# Patient Record
Sex: MALE | Race: WHITE | NOT HISPANIC OR LATINO | Employment: OTHER | URBAN - METROPOLITAN AREA
[De-identification: names, ages, dates, MRNs, and addresses within clinical notes are randomized per-mention and may not be internally consistent; named-entity substitution may affect disease eponyms.]

---

## 2020-08-14 ENCOUNTER — OFFICE VISIT CONVERTED (OUTPATIENT)
Dept: CARDIOLOGY | Facility: CLINIC | Age: 50
End: 2020-08-14
Attending: SPECIALIST

## 2020-08-28 ENCOUNTER — OFFICE VISIT CONVERTED (OUTPATIENT)
Dept: CARDIOLOGY | Facility: CLINIC | Age: 50
End: 2020-08-28
Attending: SPECIALIST

## 2021-05-10 NOTE — PROCEDURES
"   Procedure Note      Patient Name: Christian Copeland   Patient ID: [id number]   Sex: Male   YOB: 1970    Primary Care Provider: Sultana Shaw MD   Referring Provider: Sultana Shaw MD    Visit Date: August 28, 2020    Provider: Elias Lagos MD   Location: [facility]   Location Address: [address] [place] [address], [address]   [place]  [id number]   Location Phone: [phone]          FINAL REPORT   TRANSTHORACIC ECHOCARDIOGRAM REPORT    Diagnosis: Chest pain   Height: 6'1\" Weight: 285 B/P: 122/82 BSA: 2.5   [facility]   MEASUREMENTS:  RVID (Diastole) : RVID. (NORMAL: 0.7 to 2.4 cm max)   LVID (Systole): 3.1 cm (Diastole): 4.3 cm . (NORMAL: 3.7 - 5.4 cm)   Posterior Wall Thickness (Diastole): 1.0 cm. (NORMAL: 0.8 - 1.1 cm)   Septal Thickness (Diastole): 1.0 cm. (NORMAL: 0.7 - 1.2 cm)   LAID (Systole): 3.6 cm. (NORMAL: 1.9 - 3.8 cm)   Aortic Root Diameter (Diastole): 3.6 cm. (NORMAL: 2.0 - 3.7 cm)   DOPPLER:  E/A ratio 0.8 (NORMAL 0.8-2.0)   DT: 173 msec (NORMAL 140-240 msec.)   IVRT 95 m/sec (NORMAL  m/sec.)   E/E': 6 (NORMAL <8 avg.)   COMMENTS:  The patient underwent 2-D, M-Mode, and Doppler examination, including pulse-wave, continuous-wave, and color-flow analysis; the study is technically adequate.   FINDINGS:  AORTIC VALVE: Normal. Tricuspid in appearance with normal central closure.   MITRAL VALVE: Normal. Bicuspid in appearance.   TRICUSPID VALVE: Normal.   PULMONIC VALVE: Not well visualized.   LEFT ATRIUM: Normal. No intracavitary masses or clots seen. LA volume index is 18 mL/m2.   AORTIC ROOT: Normal in size with adequate motion.   LEFT VENTRICLE: Normal left ventricular systolic function. Ejection fraction 60%.   RIGHT ATRIUM: Normal.   RIGHT VENTRICLE: Normal size and function.   PERICARDIUM: Unremarkable. No evidence of effusion.   INFERIOR VENA CAVA: Diameter is 1.4 cm.   DOPPLER: Doppler examination of the aortic, mitral, tricuspid, and pulmonary valves was performed. " Normal pulmonary artery systolic pressure by Doppler. No significant valvular abnormalities.   Faxed: 08/31/2020      CONCLUSION:  1.  Normal left ventricular systolic function.   2.  No significant valvular abnormalities noted.        MD MANNY Haji/maria e    This note was transcribed by Nhi Diamond.  maria e/MANNY  The above service was transcribed by Nhi Diamond, and I attest to the accuracy of the note.  MANNY                 Electronically Signed by: Bernadette Diamond-, Other -Author on August 31, 2020 09:44:47 AM  Electronically Co-signed by: Elias Lagos MD -Reviewer on September 1, 2020 03:00:32 PM

## 2021-05-10 NOTE — PROCEDURES
Procedure Note      Patient Name: Christian Copeland   Patient ID: [id number]   Sex: Male   YOB: 1970    Primary Care Provider: Sultana Shaw MD   Referring Provider: Sultana Shaw MD    Visit Date: August 28, 2020    Provider: Elias Lagos MD   Location: [facility]   Location Address: [address] [place] [address], [address]   [place]  [id number]   Location Phone: [phone]          FINAL REPORT   CAROTID DOPPLER EXAMINATION-LEFT AND RIGHT CAROTID SYSTEM    Diagnosis: Dizziness   Copy To: Sultana Shaw MD   Tech: JTW   CLASSIFICATION OF ICA STENOSIS    Normal: ICA PSV<125 cm/sec, ICA/CCA PSV Ratio: <2.0, ICA EDV <40 cm/sec (No Plaque)   <50% stenosis: ICA PSV <125 cm/sec, ICA/CCA PSV Ratio: <2.0, ICA EDV <40 cm/sec   50-69% stenosis: ICA -230 cm/sec, ICA/CCA PSV Ratio: <2.0-4.0, ICA EDV  cm/sec   >=70% Stenosis: ICA PSV >230 cm/sec, ICA/CCA PSV Ratio: >4.0, ICA EDV> 100 cm/sec   Near Occlusion: ICA PSV: High, Low, Undetectable, ICA/CCA PSV Ratio: Variable, ICA EDV: Variable   DUPLEX VELOCITY IN cm/sec  RIGHT:  PCCA: 80/19   MCCA: 97/19   DCCA: 132/37   PICA: 67/26   MARY: 61/27   DICA: 66/28   ECA: 56/11   VERT: 33/10   LEFT:  PCCA: 115/26   MCCA: 95/27   DCCA: 155/40   PICA: 74/30   MARY: 63/25   DICA: 58/24   ECA: 52/15   VERT: 43/16   IMPRESSION:  The patient underwent bilateral carotid Doppler examination on 08/28/2020. The study is technically adequate. The following was observed:   B-mode imaging was performed. Common, internal, and external carotid arteries were identified. Imaging was of adequate quality. There was mild plaquing in the common carotid arteries.   Doppler flow velocities and spectral analysis of flow in the common, internal, and external carotid arteries was performed. The velocities were in the normal range. No evidence of any stenosis was identified.   CONCLUSION:  Bilateral carotid Doppler examination shows no significant carotid stenosis.           MD MANNY Haji/maria e    This note was transcribed by Nhi Diamond.  maria e/MANNY  The above service was transcribed by Nhi Diamond, and I attest to the accuracy of the note.  MANNY                 Electronically Signed by: Bernadette Diamond-, Other -Author on August 31, 2020 09:42:00 AM  Electronically Co-signed by: Elias Lagos MD -Reviewer on September 1, 2020 03:00:29 PM

## 2021-05-10 NOTE — PROCEDURES
Procedure Note      Patient Name: Christian Copeland   Patient ID: [id number]   Sex: Male   YOB: 1970    Primary Care Provider: Sultana Shaw MD   Referring Provider: Sultana Shaw MD    Visit Date: August 28, 2020    Provider: Elias Lagos MD   Location: [facility]   Location Address: [address] [place] [address], [facility]   [place], [place]  [id number]   Location Phone: [phone]          FINAL REPORT   HOLTER MONITOR REPORT  Date: 08/28/2020   Indication: Dizziness      The patient was monitored for a period of 24 hours.  The total number of beats was 113.994 with an average rate of 79.  The maximum beats per minute was 140 with a minimum beats per minute of 58.  There are no significant tachy- or bradyarrhythmias.    CONCLUSION:  Normal 24-hour Holter monitor.      MD MANNY Haji/maria e    This note was transcribed by Nhi Diamond.  maria e/MANNY  The above service was transcribed by Nhi Diamond, and I attest to the accuracy of                 Electronically Signed by: Bernadette Diamond-, Other -Author on September 4, 2020 08:31:41 AM  Electronically Co-signed by: Elias Lagos MD -Reviewer on September 4, 2020 02:22:48 PM

## 2021-05-10 NOTE — H&P
History and Physical      Patient Name: Christian Copeland   Patient ID: [id number]   Sex: Male   YOB: 1970    Primary Care Provider: Sultana Shaw MD   Referring Provider: Sultana Shaw MD    Visit Date: August 14, 2020    Provider: Elias Lagos MD   Location: [facility]   Location Address: [address] [place] [address], [facility]   [place]  [id number]   Location Phone: [phone]          Chief Complaint  · Dizziness   · Chest pain       History Of Present Illness  Consult requested by: Sultana Shaw   Christian Copeland is a 49 year old /White male with a history of dizziness off and on for the last several weeks. He thinks it is vertigo. Mostly it occurs when he is lying down. No other aggravating or alleviating factors. It gets worse if he turns his head. He has had some chest pain, sharp, that lasts for a few seconds to a few minutes, non-exertional, relieved spontaneously. Sometimes it lasts for 30 minutes. No aggravating or relieving factors.   PAST MEDICAL HISTORY: Negative for hypertension, diabetes mellitus or hyperlipidemia.   FAMILY HISTORY: Positive for diabetes, hypertension and heart disease.   PSYCHOSOCIAL HISTORY: Positive for mood changes and depression. He never used alcohol or tobacco.   CURRENT MEDICATIONS: None.   ALLERGIES: No known drug allergies.   CHOLESTEROL STATUS: Unknown.       Review of Systems  · Constitutional  o Admits  o : fatigue, good general health lately, recent weight changes   · Eyes  o Admits  o : blurred vision  o Denies  o : double vision  · HENT  o Denies  o : hearing loss or ringing, chronic sinus problem, swollen glands in neck  · Cardiovascular  o Denies  o : chest pain, palpitations (fast, fluttering, or skipping beats), swelling (feet, ankles, hands), shortness of breath while walking or lying flat  · Respiratory  o Denies  o : asthma or wheezing, COPD  · Gastrointestinal  o Denies  o : ulcers, nausea or  "vomiting  · Neurologic  o Admits  o : lightheaded or dizzy, headaches  o Denies  o : stroke  · Musculoskeletal  o Admits  o : back pain  o Denies  o : joint pain  · Endocrine  o Denies  o : thyroid disease, diabetes, heat or cold intolerance, excessive thirst or urination  · Heme-Lymph  o Denies  o : bleeding or bruising tendency, anemia      Vitals  Date Time BP Position Site L\R Cuff Size HR RR TEMP (F) WT  HT  BMI kg/m2 BSA m2 O2 Sat HC       08/14/2020 09:15 /82 Sitting    82 - R   285lbs 0oz 6'  1\" 37.6 2.58           Physical Examination  · Constitutional  o Appearance  o : Awake, alert, cooperative, pleasant.  · Eyes  o Pupils and Irises  o : Pupils equal and reacting to light and accommodation.  · Ears, Nose, Mouth and Throat  o Ears  o : Tympanic membranes are normal.  o Nose  o : Clear with no maxillary tenderness.  o Oral Cavity  o : Clear.  · Neck  o Inspection/Palpation  o : No JVD, bruits, thyromegaly, or adenopathy. Trachea midline. No axillary or cervical lymphadenopathy.  o Thyroid  o : No thyroid enlargement.   · Respiratory  o Inspection of Chest  o : No chest wall deformities, moving equal.  o Auscultation of Lungs  o : Good air entry with vesicular breath sounds.  o Percussion of Chest  o : Resonant bilaterally.   o Palpation of Chest  o : Equal movements bilaterally.  · Cardiovascular  o Heart  o :   § Auscultation of Heart  § : PMI is in the 5th intercostal space. S1 and S2 regular. No S3. No S4. No murmurs.  o Peripheral Vascular System  o :   § Extremities  § : No pedal edema. Peripheral pulses well felt. No cyanosis.  · Gastrointestinal  o Abdominal Examination  o : No organomegaly, masses, tenderness, bruits, or abnormal pulsations. Bowel sounds are normal.  · Musculoskeletal  o General  o : Muscle strength is normal with normal tone.  · Skin and Subcutaneous Tissue  o General Inspection  o : No rash, petechiae, or suspicious skin lesions. Skin turgor is normal.     EKG shows sinus " rhythm, within normal limits.           Assessment     ASSESSMENT AND PLAN:    1.  Dizziness:  Probably vertigo.  Will do a carotid Doppler to rule out any carotid artery stenosis, a 24-hour       Holter and an echocardiogram.  2.  Atypical chest pain:  Positive family history.  Will do a treadmill stress test to rule out any significant        ischemia.    Elias Lagos MD, [facility]  MANNY/aidan           This note was transcribed by Cecilia Roberts.  aidan/MANNY  The above service was transcribed by Cecilia Roberts, and I attest to the accuracy of the note.  MANNY               Electronically Signed by: Cecilia Roberts-, -Author on August 18, 2020 04:21:24 AM  Electronically Co-signed by: Elias Lagos MD -Reviewer on August 18, 2020 02:28:49 PM

## 2021-05-15 VITALS
DIASTOLIC BLOOD PRESSURE: 82 MMHG | HEIGHT: 73 IN | BODY MASS INDEX: 37.77 KG/M2 | WEIGHT: 285 LBS | HEART RATE: 82 BPM | SYSTOLIC BLOOD PRESSURE: 122 MMHG

## 2021-06-11 ENCOUNTER — OFFICE VISIT (OUTPATIENT)
Dept: ORTHOPEDIC SURGERY | Facility: CLINIC | Age: 51
End: 2021-06-11

## 2021-06-11 VITALS — WEIGHT: 295 LBS | BODY MASS INDEX: 39.1 KG/M2 | HEART RATE: 103 BPM | HEIGHT: 73 IN | OXYGEN SATURATION: 95 %

## 2021-06-11 DIAGNOSIS — Z98.890 S/P ORIF (OPEN REDUCTION INTERNAL FIXATION) FRACTURE: ICD-10-CM

## 2021-06-11 DIAGNOSIS — M25.571 RIGHT ANKLE PAIN, UNSPECIFIED CHRONICITY: Primary | ICD-10-CM

## 2021-06-11 DIAGNOSIS — Z87.81 S/P ORIF (OPEN REDUCTION INTERNAL FIXATION) FRACTURE: ICD-10-CM

## 2021-06-11 PROCEDURE — 99024 POSTOP FOLLOW-UP VISIT: CPT | Performed by: ORTHOPAEDIC SURGERY

## 2021-06-11 RX ORDER — MONTELUKAST SODIUM 10 MG/1
10 TABLET ORAL DAILY
Status: ON HOLD | COMMUNITY
Start: 2021-05-23 | End: 2022-12-12

## 2021-06-11 RX ORDER — ACETAMINOPHEN 325 MG/1
650 TABLET ORAL
COMMUNITY
End: 2022-06-02

## 2021-06-11 RX ORDER — BENZONATATE 100 MG/1
200 CAPSULE ORAL 3 TIMES DAILY PRN
COMMUNITY
Start: 2021-05-27 | End: 2022-06-02

## 2021-06-11 RX ORDER — TEMAZEPAM 30 MG/1
30 CAPSULE ORAL NIGHTLY PRN
COMMUNITY

## 2021-06-11 RX ORDER — ASPIRIN 325 MG
TABLET, DELAYED RELEASE (ENTERIC COATED) ORAL
COMMUNITY
Start: 2021-05-27 | End: 2021-08-09 | Stop reason: SDUPTHER

## 2021-06-11 NOTE — PROGRESS NOTES
"Chief Complaint  Pain of the Right Ankle     Subjective      Christian Copeland presents to [facility] for follow up evaluation of the right knee. The patient is S/P Open reduction internal fixation of right bimalleolar ankle fracture. His staples and splint were removed today. He is overall doing well. He is ambulating with a knee scooter. He states his pain is controlled and he isn't having to take any pain medication.     No Known Allergies     Social History     Socioeconomic History   • Marital status:      Spouse name: Not on file   • Number of children: Not on file   • Years of education: Not on file   • Highest education level: Not on file   Tobacco Use   • Smoking status: Never Smoker   • Smokeless tobacco: Never Used   Vaping Use   • Vaping Use: Never used        Review of Systems     Objective   Vital Signs:   Pulse 103   Ht 185.4 cm (73\")   Wt 134 kg (295 lb)   SpO2 95%   BMI 38.92 kg/m²       Physical Exam  Constitutional:       Appearance: Normal appearance. He is well-developed and normal weight.   HENT:      Head: Normocephalic.      Right Ear: Hearing and external ear normal.      Left Ear: Hearing and external ear normal.      Nose: Nose normal.   Eyes:      Conjunctiva/sclera: Conjunctivae normal.   Cardiovascular:      Rate and Rhythm: Normal rate.   Pulmonary:      Effort: Pulmonary effort is normal.      Breath sounds: No wheezing or rales.   Abdominal:      Palpations: Abdomen is soft.      Tenderness: There is no abdominal tenderness.   Musculoskeletal:      Cervical back: Normal range of motion.   Skin:     Findings: No rash.   Neurological:      Mental Status: He is alert and oriented to person, place, and time.   Psychiatric:         Mood and Affect: Mood and affect normal.         Judgment: Judgment normal.       Ortho Exam      Right Ankle- incision well healing. No signs of infection. Positive EHL, FHL, GS and TA. Sensation intact to all 5 nerves " of the foot. Positive pulses. Neurovascularly intact. Can wiggle his toes. ROM limited secondary to pain. Mild swelling. Resolving bruising to ankle and lower leg. Dorsiflexion 5. Plantar flexion 30.     Procedures    X-Ray Report:  Right ankle(s) X-Ray  Indication: Evaluation of Right ankle pain  AP and Lateral view(s)  Findings: well aligned bimalleolar ankle fracture with intact hardware. No evidence of hardware failure.   Prior studies available for comparison: yes           Imaging Results (Most Recent)     None           Result Review :       XR Ankle 2 View Right    Result Date: 2021  Narrative:           [facility]           PACS RADIOLOGY REPORT Patient: PIA UGALDE     Acct: [id number]     Report: #[id number] UNIT #: [id number]     DOS: 2021 1712     Order #: [id number] Location:      : 1970 ORDERING: GIOVANNY HERNANDEZ DICTATING: Dylan Duvall #: [id number]     EXAM: ANK2RT - ANKLE 2 VIEWS AP LAT RIGHT REASON FOR EXAM: Injury REASON FOR VISIT: SYNCOPE,RIGHT ANKLE FX Signed -------------------------------------------------------------------------------------------------------------------- PROCEDURE: ANKLE 2 VIEWS AP AND LAT RIGHT     COMPARISON: [facility], CR, ANKLE >OR= 3V RT, 2021, 15:04.     INDICATIONS: POSTREDUCTION     FINDINGS:      Interval closed reduction of the fracture/dislocation of the right ankle.  There is improved   alignment of the ankle mortise.  There are displaced fractures of the right fibula and posterior   malleolus.  The fibular fracture is comminuted.     IMPRESSION:  Interval closed reduction of the fracture/dislocation of the right ankle.       DYLAN DUVALL MD         Electronically Signed and Approved By: DYLAN DUVALL MD on 2021 at 17:53                 XR Ankle 3+ View Right    Result Date: 2021  Narrative:           [facility] Slaughter  [facility]           PACS RADIOLOGY REPORT Patient: PIA UGALDE     Acct: [id number]     Report: #[id number] UNIT #: [id number]     DOS: 2021 0000     Order #: [id number] Location: [facility]     : 1970 ORDERING: Osmany Kaminski DICTATING: JE ROB MD REQ #: [id number]     EXAM: ANK3RT - ANKLE  3V RIGHT REASON FOR EXAM: SYNCOPE,RIGHT ANKLE FRACTURE, 14.5 SECONDS FLUORO TIME, 0.428 mGy REASON FOR VISIT: SYNCOPE,RIGHT ANKLE FX Signed -------------------------------------------------------------------------------------------------------------------- PROCEDURE: ANKLE 3 VIEW RIGHT     COMPARISON: [facility], CR, ANKLE 2 VIEWS AP AND LAT RT, 2021, 17:11.     INDICATIONS: SYNCOPE,RIGHT ANKLE FRACTURE, 14.5 SECONDS FLUORO TIME, 0.428 mGy     FINDINGS:   Intraoperative fluoroscopy provided for plate and screw fixation of the distal fibular fracture.    The final 2 images show side plate and screws in place.  The fracture is fixed in near anatomic   alignment.     CONCLUSION: Intraoperative fluoroscopy as above.  Refer to the operative note for details.      JE ROB MD         Electronically Signed and Approved By: JE ROB MD on 2021 at 14:37                 XR Ankle 3+ View Right    Result Date: 2021  Narrative:           [facility]           PACS RADIOLOGY REPORT Patient: PIA UGALDE     Acct: [id number]     Report: #[id number] UNIT #: [id number]     DOS: 2021 1455     Order #: [id number] Location: ER     : 1970 ORDERING: GIOVANNY HERNANDEZ DICTATING: JE ROB MD REQ #: [id number]     EXAM: ANK3RT - ANKLE  3V RIGHT REASON FOR EXAM: Injury REASON FOR VISIT: SYNCOPE,RIGHT ANKLE FX Signed -------------------------------------------------------------------------------------------------------------------- PROCEDURE: ANKLE 3 VIEW RIGHT     COMPARISON: None     INDICATIONS: GENERALIZED RIGHT ANKLE  PAIN AFTER FALL TODAY     FINDINGS:   Ankle fracture dislocation.  There is complex oblique fracture lines in the distal fibula.  There   is widening of the medial ankle mortise up to 2.2 cm.  There is a mildly displaced posterior   malleolar fracture.       CONCLUSION: Complex ankle fracture dislocation as above      JE ROB MD         Electronically Signed and Approved By: JE ROB MD on 2021 at 15:51                 XR Chest 1 View    Result Date: 2021  Narrative:           [facility]           PACS RADIOLOGY REPORT Patient: PIA UGALDE     Acct: [id number]     Report: #[id number] UNIT #: [id number]     DOS: 2021 1557     Order #: [id number] Location:      : 1970 ORDERING: GIOVANNY HERNANDEZ DICTATING: Dylan Duvall #: [id number]     EXAM: CXR1 - CHEST 1 View AP PA REASON FOR EXAM: Palpitations REASON FOR VISIT: SYNCOPE,RIGHT ANKLE FX Signed -------------------------------------------------------------------------------------------------------------------- PROCEDURE: CHEST AP/PA SINGLE VIEW     COMPARISON: None     INDICATIONS: Palpitations/CHEST CONGESTION     FINDINGS:      The lungs are well-expanded. The heart and pulmonary vasculature are within normal limits. No   pleural effusions are identified. There are no active appearing infiltrates.     IMPRESSION: No active disease.     DYLAN DUVALL MD         Electronically Signed and Approved By: DYLAN DUVALL MD on 2021 at 17:54                 Duplex Carotid Ultrasound CAR    Result Date: 2021  Narrative:           [facility]           VASCULAR LAB REPORT Patient: PIA UGALDE     Acct: [id number]     Report: #[id number] UNIT #: [id number]     DOS: 2021     Order #: [id number] Location: [facility]     : 1970 ORDERING: Osmany Kaminski DICTATING: SANDOR Cordova REQ #: [id number]     EXAM: DOPSC  - DUPLEX SCAN CAROTIDS REASON FOR EXAM: SYNCOPE,RIGHT ANKLE FX REASON FOR VISIT: SYNCOPE,RIGHT ANKLE FX Signed -------------------------------------------------------------------------------------------------------------------- PROCEDURE: CAROTID DOP SCAN (BILATERAL)     COMPARISON: None     INDICATIONS: SYNCOPE,RIGHT ANKLE FX     TECHNIQUE: Color duplex Doppler ultrasound and pulsed Doppler analysis were performed to evaluate   the cervical carotid arteries and vertebral flow.  All measurements for carotid artery narrowing or   stenosis were obtained using the ipsilateral distal internal carotid artery as the reference value.       Carotid Right Measurements:     Location PSV EDV Angle RI  Bulb -66.70 cm/s -15.60 cm/s 60 deg N/A  Prox .00 cm/s 23.40 cm/s 60 deg 0.78  Dist .00 cm/s 27.70 cm/s 60 deg 0.78  Prox ICA -59.00 cm/s -18.00 cm/s 60 deg 0.70  Mid ICA -49.10 cm/s -18.20 cm/s 60 deg 0.63  Dist ICA -61.90 cm/s -26.50 cm/s 60 deg 0.57  ECA -69.30 cm/s -7.37 cm/s 60 deg 0.89  Vert -36.90 cm/s -12.30 cm/s 60 deg 0.67     Carotid Left Measurements:     Location PSV EDV Angle RI  Bulb -41.50 cm/s -7.94 cm/s 54 deg N/A  Prox .00 cm/s 25.20 cm/s 60 deg 0.81  Dist CCA -110.00 cm/s -20.30 cm/s 60 deg 0.82  Prox ICA 65.90 cm/s 15.40 cm/s 60 deg 0.77  Mid ICA -65.30 cm/s -28.00 cm/s 60 deg 0.57  Dist ICA -74.10 cm/s -31.30 cm/s 60 deg 0.58  ECA -85.60 cm/s -8.23 cm/s 60 deg 0.90  Vert -50.50 cm/s -15.40 cm/s 60 deg 0.70        Right Impression:   No velocity elevations are identified in the common, internal or external carotid vessels that   would suggest any significant stenosis.  No plaque is present in the bifurcation region.    Color-flow shows patent vessels.  Vertebral flow is antegrade by color flow and Doppler analysis     Left Impression:   No velocity elevations are identified in the common, internal or external carotid vessels that   would suggest any significant stenosis.  No plaque is  present in the bifurcation region.    Color-flow shows patent vessels.  Vertebral flow is antegrade by color flow and Doppler analysis     CONCLUSION:   1. No stenosis is present in the carotid bifurcations bilaterally.  2. There is no plaque bilaterally.  3. Vertebral flow is antegrade bilaterally.     CAROTID ARTERY DISEASE-DIAGNOSTIC PARAMETERS:     Diameter Stenosis % Peak Systolic Peak Diastole Flow Character     0-59 Normal/Non-Significant <150cm/s <45 cm/s Normal-Moderate Spectral Broadening     60-79 Moderate to Severe 150-250 cm/s  cm/s Moderate-severe Spectral Broadening     80-99 Severe to Critical  >250-615 cm/s >140 cm/s Severe Spectral Broadening     99 Critical Extremely Low N/A Highly Turbulent     100 Total Occlusion N/A N/A N/A  CCA flow is zero CCA flow is zero      Leon Cordova MD         Electronically Signed and Approved By: Leon Cordova MD on 5/25/2021 at 10:21                          Assessment and Plan     DX: ORIF right ankle bimalleolar ankle fracture.    The patient was placed into a tall CAM walker boot. 25-50% weightbearing. Order for physical therapy given today.  Steri-strips were placed on the incision today.     Call or return if worsening symptoms.    Follow Up     Plan to follow up in 4 weeks with repeat x-rays.       Patient was given instructions and counseling regarding his condition or for health maintenance advice. Please see specific information pulled into the AVS if appropriate.     Scribed for Christian Fish MD by Sophie Fry.  06/11/21   08:50 EDT    I have personally performed the services described in this document as scribed by the above individual and it is both accurate and complete.  Christian Fish MD 06/11/21  08:50 EDT

## 2021-06-22 ENCOUNTER — TREATMENT (OUTPATIENT)
Dept: PHYSICAL THERAPY | Facility: CLINIC | Age: 51
End: 2021-06-22

## 2021-06-22 DIAGNOSIS — M25.671 DECREASED RANGE OF MOTION OF RIGHT ANKLE: ICD-10-CM

## 2021-06-22 DIAGNOSIS — M25.571 ACUTE RIGHT ANKLE PAIN: ICD-10-CM

## 2021-06-22 DIAGNOSIS — R29.898 ANKLE WEAKNESS: ICD-10-CM

## 2021-06-22 DIAGNOSIS — Z98.890 S/P ORIF (OPEN REDUCTION INTERNAL FIXATION) FRACTURE: Primary | ICD-10-CM

## 2021-06-22 DIAGNOSIS — Z87.81 S/P ORIF (OPEN REDUCTION INTERNAL FIXATION) FRACTURE: Primary | ICD-10-CM

## 2021-06-22 PROCEDURE — 97161 PT EVAL LOW COMPLEX 20 MIN: CPT | Performed by: PHYSICAL THERAPIST

## 2021-06-22 PROCEDURE — 97110 THERAPEUTIC EXERCISES: CPT | Performed by: PHYSICAL THERAPIST

## 2021-06-22 NOTE — PROGRESS NOTES
Physical Therapy Initial Evaluation and Plan of Care    Patient: Christian Copeland   : 1970  Diagnosis/ICD-10 Code:  S/p R ankle ORIF  Referring practitioner: Christian Fish MD  Date of Initial Visit: 2021  Today's Date: 2021  Patient seen for Visit count could not be calculated. Make sure you are using a visit which is associated with an episode. sessions           Subjective Questionnaire: LEFS:  or 60-79% limited      Subjective Evaluation    History of Present Illness  Date of surgery: 2021  Mechanism of injury: Pt was diagnosed with bronchitis on a  and he was walking.  He states he passed out from coughing so hard and fell and fractured his ankle.  Pt had a R ankle ORIF on 21.  Pt was placed in a boot at his last follow up which was about two weeks after the surgery.  Pt has been ambulating with crutches and a RW.  Pt is toe touch to 50% weight bearing on his R LE.  Pt returns to the doctor on .  Pt is retired.  Pt is not taking pain medication.  Pt had home health therapy but they only did one treatment.    Prior medical history: unremarkable      Precautions and Work Restrictions: toe touch to 50% weight bearing on R LE with bootPain  Current pain ratin  At best pain ratin  At worst pain ratin    Social Support  Lives with: spouse    Treatments  Discharged from (in last 30 days): home health care  Patient Goals  Patient goals for therapy: decreased edema, improved balance, increased strength, independence with ADLs/IADLs, return to sport/leisure activities and increased motion             Objective          Tenderness     Additional Tenderness Details  No tenderness to palpation through R ankle/foot    Neurological Testing     Sensation     Ankle/Foot     Right Ankle/Foot   Intact: light touch     Active Range of Motion   Left Ankle/Foot   Dorsiflexion (ke): 12 degrees   Plantar flexion: 40 degrees   Inversion: 40 degrees   Eversion: 15  degrees     Right Ankle/Foot   Dorsiflexion (ke): 7 degrees   Plantar flexion: 20 degrees   Inversion: 35 degrees   Eversion: 25 degrees     Passive Range of Motion     Right Ankle/Foot    Dorsiflexion (ke): 8 degrees   Plantar flexion: 25 degrees   Inversion: 35 degrees   Eversion: 25 degrees     Joint Play     Right Ankle/Foot  Joints within functional limits are the midfoot and forefoot. Hypomobile in the talocrural joint and subtalar joint.     Strength/Myotome Testing     Left Knee   Flexion: 5  Extension: 5    Right Knee   Flexion: 5  Extension: 5    Left Ankle/Foot   Dorsiflexion: 5  Plantar flexion: 5  Inversion: 5  Eversion: 5    Right Ankle/Foot   Dorsiflexion: 4+  Plantar flexion: 4-  Inversion: 5  Eversion: 4+    Ambulation   Weight-Bearing Status   Weight-Bearing Status (Right): partial weight-bearing    toe touch to 50% weight bearing with boot   Assistive device used: crutches    Comments   Ambulating with boot on R LE     General Comments     Ankle/Foot Comments   Edema through R foot and ankle       See Exercise, Manual, and Modality Logs for complete treatment.       Assessment & Plan     Assessment  Impairments: abnormal gait, abnormal or restricted ROM, impaired balance, impaired physical strength, lacks appropriate home exercise program, pain with function and weight-bearing intolerance  Assessment details: Pt presents with limitations, noted below, that impede her ability to walk, go up/down stairs, and perform ADLs.  The patient presents with a diagnosis of s/p R ankle ORIF and has R ankle pain, limited ROM, weakness, and ambulatory dysfunction and will benefit from therapeutic exercises, manual therapy, and modalities to improve tolerance to functional activities. The skills of a therapist will be required to safely and effectively implement the following treatment plan to restore maximal level of function.     Prognosis: good  Functional Limitations: sleeping, walking, standing and  stooping  Goals  Plan Goals: 1. The patient has limited ROM for the R ankle.   LTG 1: 12 weeks:  In order to allow the patient greater ease with forward, lateral, and diagonal mobility the patient will demonstrate improved ROM of the R ankle as follows:  10 degrees of dorsiflexion and 40 degrees of plantarflexion.  STATUS:  New   STG 1a: 6 weeks:  The patient will demonstrate improved ROM of the R ankle as follows:  9 degrees of dorsiflexion and 30 degrees of plantarflexion.  STATUS:  New      2. The patient has limited strength of the R ankle.   LTG 2: 12 weeks:  In order to provide greater joint stability of the R ankle the patient will demonstrate improved strength of the R ankle as follows:  5/5 dorsiflexion, 5/5 inversion, 5/5 eversion, and 5/5 plantar flexion.   STATUS:  New   STG 2a: 6 weeks:  The patient will demonstrate improved strength of the R ankle as follows:  5/5 dorsiflexion, 5/5 inversion, 5/5 eversion, and 5/5 plantar flexion.   STATUS:  New      3. The patient has gait dysfunction.   LTG 3: 12 weeks:  The patient will ambulate without assistive device, independently, for community distances with minimal limp to the R lower extremity in order to improve mobility and allow patient to perform activities such as grocery shopping with greater ease.   STATUS:  New   STG 3a: The patient will be independent in HEP.   STATUS:  New      4. Mobility: Walking/Moving Around Functional Limitation     LTG 4: 12 weeks:  The patient will demonstrate 20-39%% limitation by achieving a score of 55 on the Lower Extremity Functional Scale.   STATUS:  New   STG 4a: 6 weeks:  The patient will demonstrate 40-59% limitation by achieving a score of 45 on the Lower Extremity Functional Scale.     STATUS:  New     TREATMENT: Manual therapy, therapeutic exercise, home exercise instruction, and modalities as needed to include:  moist heat, electrical stimulation, ultrasound, and ice.     Plan  Therapy options: will be seen for  skilled physical therapy services  Planned modality interventions: cryotherapy, dry needling, electrical stimulation/Burmese stimulation, ultrasound and hydrotherapy  Planned therapy interventions: flexibility, functional ROM exercises, home exercise program, joint mobilization, manual therapy, neuromuscular re-education, soft tissue mobilization, spinal/joint mobilization, strengthening, stretching, balance/weight-bearing training, gait training and therapeutic activities  Frequency: 3x week  Duration in weeks: 12  Treatment plan discussed with: patient        History # of Personal Factors and/or Comorbidities: LOW (0)  Examination of Body System(s): # of elements: LOW (1-2)  Clinical Presentation: STABLE   Clinical Decision Making: LOW       Timed:         Manual Therapy:         mins  64288;     Therapeutic Exercise:    12     mins  83208;     Neuromuscular Sachi:        mins  67615;    Therapeutic Activity:          mins  27382;     Gait Training:           mins  52638;     Ultrasound:          mins  95400;    Ionto                                   mins   35140  Self Care                            mins   69261  Canalith Repos         mins 37353      Un-Timed:  Electrical Stimulation:         mins  99547 (MC );  Dry Needling          mins self-pay  Traction          mins 55561  Low Eval     21     Mins  94979  Mod Eval          Mins  02619  High Eval                            Mins  03633  Re-Eval                               mins  03376        Timed Treatment:   12   mins   Total Treatment:     33   mins    PT SIGNATURE: Tamara Mg PT   DATE TREATMENT INITIATED: 6/22/2021    Initial Certification  Certification Period: 9/20/2021  I certify that the therapy services are furnished while this patient is under my care.  The services outlined above are required by this patient, and will be reviewed every 90 days.     PHYSICIAN: Christian Fish MD      DATE:     Please sign and return via fax to  [phone]. Thank you, [facility].

## 2021-06-24 ENCOUNTER — TREATMENT (OUTPATIENT)
Dept: PHYSICAL THERAPY | Facility: CLINIC | Age: 51
End: 2021-06-24

## 2021-06-24 DIAGNOSIS — M25.571 ACUTE RIGHT ANKLE PAIN: Primary | ICD-10-CM

## 2021-06-24 DIAGNOSIS — Z87.81 S/P ORIF (OPEN REDUCTION INTERNAL FIXATION) FRACTURE: ICD-10-CM

## 2021-06-24 DIAGNOSIS — Z98.890 S/P ORIF (OPEN REDUCTION INTERNAL FIXATION) FRACTURE: ICD-10-CM

## 2021-06-24 DIAGNOSIS — M25.671 DECREASED RANGE OF MOTION OF RIGHT ANKLE: ICD-10-CM

## 2021-06-24 DIAGNOSIS — R29.898 ANKLE WEAKNESS: ICD-10-CM

## 2021-06-24 PROCEDURE — 97140 MANUAL THERAPY 1/> REGIONS: CPT | Performed by: PHYSICAL THERAPIST

## 2021-06-24 PROCEDURE — 97110 THERAPEUTIC EXERCISES: CPT | Performed by: PHYSICAL THERAPIST

## 2021-06-24 NOTE — PROGRESS NOTES
Physical Therapy Daily Progress Note    Patient: Christian Copeland   : 1970  Diagnosis/ICD-10 Code:  Acute right ankle pain [M25.571]  Referring practitioner: Christian Fish MD  Date of Initial Visit: Type: THERAPY  Noted: 2021  Today's Date: 2021  Patient seen for 2 sessions           Subjective   The patient reported 0/10 pain at the start of PT today and minimal complaints of pain with exercise performance.    Objective   See Exercise, Manual, and Modality Logs for complete treatment.       Assessment/Plan    The patient demonstrated good tolerance to all interventions today. He would benefit from continued skilled PT to achieve long term functional goals.       Manual Therapy:    16     mins  07837;  Therapeutic Exercise:    13     mins  72859;     Neuromuscular Sachi:    0    mins  01132;    Therapeutic Activity:     0     mins  67027;     Gait Trainin     mins  49951;     Ultrasound:     0     mins  42322;    Electrical Stimulation:    0     mins  36921 (MC );  Dry Needling     0     mins self-pay    Timed Treatment:   29   mins   Total Treatment:     29   mins    Markos Zimmerman PT  Physical Therapist

## 2021-06-29 ENCOUNTER — TREATMENT (OUTPATIENT)
Dept: PHYSICAL THERAPY | Facility: CLINIC | Age: 51
End: 2021-06-29

## 2021-06-29 DIAGNOSIS — M25.571 ACUTE RIGHT ANKLE PAIN: Primary | ICD-10-CM

## 2021-06-29 DIAGNOSIS — Z87.81 S/P ORIF (OPEN REDUCTION INTERNAL FIXATION) FRACTURE: ICD-10-CM

## 2021-06-29 DIAGNOSIS — M25.671 DECREASED RANGE OF MOTION OF RIGHT ANKLE: ICD-10-CM

## 2021-06-29 DIAGNOSIS — R29.898 ANKLE WEAKNESS: ICD-10-CM

## 2021-06-29 DIAGNOSIS — Z98.890 S/P ORIF (OPEN REDUCTION INTERNAL FIXATION) FRACTURE: ICD-10-CM

## 2021-06-29 PROCEDURE — 97110 THERAPEUTIC EXERCISES: CPT | Performed by: PHYSICAL THERAPIST

## 2021-06-29 PROCEDURE — 97140 MANUAL THERAPY 1/> REGIONS: CPT | Performed by: PHYSICAL THERAPIST

## 2021-06-29 NOTE — PROGRESS NOTES
Physical Therapy Daily Progress Note    Patient: Christian Copeland   : 1970  Diagnosis/ICD-10 Code:  Acute right ankle pain [M25.571]  Referring practitioner: No ref. provider found  Date of Initial Visit: Type: THERAPY  Noted: 2021  Today's Date: 2021  Patient seen for 3 sessions           Subjective   The patient reported that he has started taking his boot off more at home when he is sitting and at night when he sleeps. He hasn't noticed any increased pain. He has noticed improved ankle flexibility.    Objective   See Exercise, Manual, and Modality Logs for complete treatment.       Assessment/Plan  The patient demonstrated improved passive ankle ROM today compared to previous session following manual therapy. Continue with current PT plan of care to improve ankle strength and ROM.         Manual Therapy:    13     mins  89444;  Therapeutic Exercise:    14     mins  05787;     Neuromuscular Sachi:    0    mins  32273;    Therapeutic Activity:     0     mins  48934;     Gait Trainin     mins  11193;     Ultrasound:     0     mins  11033;    Electrical Stimulation:    0     mins  22371 ( );  Dry Needling     0     mins self-pay    Timed Treatment:   27   mins   Total Treatment:     27   mins    Markos Zimmerman, PT  Physical Therapist

## 2021-07-01 ENCOUNTER — TREATMENT (OUTPATIENT)
Dept: PHYSICAL THERAPY | Facility: CLINIC | Age: 51
End: 2021-07-01

## 2021-07-01 DIAGNOSIS — Z87.81 S/P ORIF (OPEN REDUCTION INTERNAL FIXATION) FRACTURE: ICD-10-CM

## 2021-07-01 DIAGNOSIS — M25.571 ACUTE RIGHT ANKLE PAIN: Primary | ICD-10-CM

## 2021-07-01 DIAGNOSIS — M25.671 DECREASED RANGE OF MOTION OF RIGHT ANKLE: ICD-10-CM

## 2021-07-01 DIAGNOSIS — R29.898 ANKLE WEAKNESS: ICD-10-CM

## 2021-07-01 DIAGNOSIS — Z98.890 S/P ORIF (OPEN REDUCTION INTERNAL FIXATION) FRACTURE: ICD-10-CM

## 2021-07-01 PROCEDURE — 97110 THERAPEUTIC EXERCISES: CPT | Performed by: PHYSICAL THERAPIST

## 2021-07-01 PROCEDURE — 97140 MANUAL THERAPY 1/> REGIONS: CPT | Performed by: PHYSICAL THERAPIST

## 2021-07-01 NOTE — PROGRESS NOTES
Physical Therapy Daily Progress Note    Patient: Christian Copeland   : 1970  Diagnosis/ICD-10 Code:  Acute right ankle pain [M25.571]  Referring practitioner: Christian Fish MD  Date of Initial Visit: Type: THERAPY  Noted: 2021  Today's Date: 2021  Patient seen for 4 sessions           Subjective   The patient reported that he removed his steri strips and only 1 bled just a little upon removal.    Objective   See Exercise, Manual, and Modality Logs for complete treatment.     Assessment/Plan    The patient demonstrated good tolerance to all interventions today. He continues to present with moderate ankle stiffness and would benefit from continued skilled PT at this time to improve ankle mobility and function.       Manual Therapy:    16     mins  12776;  Therapeutic Exercise:    12     mins  03115;     Neuromuscular Sachi:    0    mins  83518;    Therapeutic Activity:     0     mins  38779;     Gait Trainin     mins  18994;     Ultrasound:     0     mins  41744;    Electrical Stimulation:    0     mins  37948 ( );  Dry Needling     0     mins self-pay    Timed Treatment:   28   mins   Total Treatment:     28   mins    Markos Zimmerman PT  Physical Therapist

## 2021-07-06 ENCOUNTER — TREATMENT (OUTPATIENT)
Dept: PHYSICAL THERAPY | Facility: CLINIC | Age: 51
End: 2021-07-06

## 2021-07-06 DIAGNOSIS — M25.571 ACUTE RIGHT ANKLE PAIN: Primary | ICD-10-CM

## 2021-07-06 DIAGNOSIS — R29.898 ANKLE WEAKNESS: ICD-10-CM

## 2021-07-06 DIAGNOSIS — Z87.81 S/P ORIF (OPEN REDUCTION INTERNAL FIXATION) FRACTURE: ICD-10-CM

## 2021-07-06 DIAGNOSIS — Z98.890 S/P ORIF (OPEN REDUCTION INTERNAL FIXATION) FRACTURE: ICD-10-CM

## 2021-07-06 DIAGNOSIS — M25.671 DECREASED RANGE OF MOTION OF RIGHT ANKLE: ICD-10-CM

## 2021-07-06 PROCEDURE — 97140 MANUAL THERAPY 1/> REGIONS: CPT | Performed by: PHYSICAL THERAPIST

## 2021-07-06 PROCEDURE — 97110 THERAPEUTIC EXERCISES: CPT | Performed by: PHYSICAL THERAPIST

## 2021-07-06 NOTE — PROGRESS NOTES
Physical Therapy Daily Progress Note    Patient: Christian Copeland   : 1970  Diagnosis/ICD-10 Code:  Acute right ankle pain [M25.571]  Referring practitioner: Christian Fish MD  Date of Initial Visit: Type: THERAPY  Noted: 2021  Today's Date: 2021  Patient seen for 5 sessions           Subjective   The patient reported that he did more walking over the weekend than he has been doing since surgery. His ankle is a little more swollen this morning but feels like it is moving better.    Objective   See Exercise, Manual, and Modality Logs for complete treatment.     Assessment/Plan  The patient demonstrated good tolerance to all interventions today. He demonstrates gradual improvement in ankle ROM in all planes of motion. Continue to progress as tolerated.       Manual Therapy:    16     mins  46299;  Therapeutic Exercise:    12     mins  74170;     Neuromuscular Sachi:    0    mins  69755;    Therapeutic Activity:     0     mins  75109;     Gait Trainin     mins  44907;     Ultrasound:     0     mins  32850;    Electrical Stimulation:    0     mins  23041 ( );  Dry Needling     0     mins self-pay    Timed Treatment:   28   mins   Total Treatment:     28   mins    Markos Zimmerman PT  Physical Therapist

## 2021-07-08 ENCOUNTER — TREATMENT (OUTPATIENT)
Dept: PHYSICAL THERAPY | Facility: CLINIC | Age: 51
End: 2021-07-08

## 2021-07-08 DIAGNOSIS — M25.571 ACUTE RIGHT ANKLE PAIN: Primary | ICD-10-CM

## 2021-07-08 DIAGNOSIS — Z87.81 S/P ORIF (OPEN REDUCTION INTERNAL FIXATION) FRACTURE: ICD-10-CM

## 2021-07-08 DIAGNOSIS — Z98.890 S/P ORIF (OPEN REDUCTION INTERNAL FIXATION) FRACTURE: ICD-10-CM

## 2021-07-08 DIAGNOSIS — M25.671 DECREASED RANGE OF MOTION OF RIGHT ANKLE: ICD-10-CM

## 2021-07-08 DIAGNOSIS — R29.898 ANKLE WEAKNESS: ICD-10-CM

## 2021-07-08 PROCEDURE — 97110 THERAPEUTIC EXERCISES: CPT | Performed by: PHYSICAL THERAPIST

## 2021-07-08 PROCEDURE — 97140 MANUAL THERAPY 1/> REGIONS: CPT | Performed by: PHYSICAL THERAPIST

## 2021-07-08 NOTE — PROGRESS NOTES
Physical Therapy Daily Progress Note    Patient: Christian Copeland   : 1970  Diagnosis/ICD-10 Code:  Acute right ankle pain [M25.571]  Referring practitioner: Christian Fish MD  Date of Initial Visit: Type: THERAPY  Noted: 2021  Today's Date: 2021  Patient seen for 6 sessions           Subjective   The patient reported that he has noticed continued improvement in ankle mobility. He follows up with Dr. Fish in the next week and hopes to get cleared to discontinue using the walking boot.    Objective   See Exercise, Manual, and Modality Logs for complete treatment.     Assessment/Plan  The patient demonstrated good tolerance to all PT interventions today. His ankle ROM is gradually improving, but remains swollen. Continue to progress as tolerated.       Manual Therapy:    16     mins  70488;  Therapeutic Exercise:    13     mins  40379;     Neuromuscular Sachi:    0    mins  15074;    Therapeutic Activity:     0     mins  22504;     Gait Trainin     mins  92198;     Ultrasound:     0     mins  02834;    Electrical Stimulation:    0     mins  51726 ( );  Dry Needling     0     mins self-pay    Timed Treatment:   29   mins   Total Treatment:     29   mins    Markos Zimmerman PT  Physical Therapist

## 2021-07-12 ENCOUNTER — OFFICE VISIT (OUTPATIENT)
Dept: ORTHOPEDIC SURGERY | Facility: CLINIC | Age: 51
End: 2021-07-12

## 2021-07-12 VITALS — HEIGHT: 73 IN | HEART RATE: 110 BPM | WEIGHT: 302.6 LBS | OXYGEN SATURATION: 96 % | BODY MASS INDEX: 40.11 KG/M2

## 2021-07-12 DIAGNOSIS — Z47.89 AFTERCARE FOLLOWING SURGERY OF THE MUSCULOSKELETAL SYSTEM: ICD-10-CM

## 2021-07-12 DIAGNOSIS — Z47.89 AFTERCARE FOLLOWING SURGERY OF THE MUSCULOSKELETAL SYSTEM: Primary | ICD-10-CM

## 2021-07-12 PROCEDURE — 99024 POSTOP FOLLOW-UP VISIT: CPT | Performed by: PHYSICIAN ASSISTANT

## 2021-07-12 NOTE — PROGRESS NOTES
"Chief Complaint  Pain and Follow-up of the Right Ankle    Subjective          Christian Copeland is a 50 y.o. male  presents to [facility] for   History of Present Illness    Patient presents for follow-up evaluation of ORIF right bimalleolar ankle fracture, 5/26/2021.  Patient was last seen by Dr. Fish, had staples removed was placed into a walking boot, was told to do 25 to 50% weightbearing with physical therapy.  Patient states he has been attending physical therapy with good results, he states he has been attending 2 times per week and been doing home exercises.  When at rest he elevates the ankle and foot.  He states he has improved with range of motion, states he has been walking at home and sometimes at therapy without his walking boot.  Patient states his pain is controlled he denies need for pain medication since about 2 days after surgery he takes aspirin as needed.  Patient denies numbness and tingling, denies calf pain, admits to swelling if he lets the foot and ankle hang down, states swelling improves with rest and elevation.  No Known Allergies     Social History     Socioeconomic History   • Marital status:      Spouse name: Not on file   • Number of children: Not on file   • Years of education: Not on file   • Highest education level: Not on file   Tobacco Use   • Smoking status: Never Smoker   • Smokeless tobacco: Never Used   Vaping Use   • Vaping Use: Never used        REVIEW OF SYSTEMS    Constitutional: Denies fevers, chills, weight loss  Cardiovascular: Denies chest pain, shortness of breath  Skin: Denies rashes, acute skin changes  Neurologic: Denies headache, loss of consciousness  MSK: Right ankle pain      Objective   Vital Signs:   Pulse 110   Ht 185.4 cm (73\")   Wt (!) 137 kg (302 lb 9.6 oz)   SpO2 96%   BMI 39.92 kg/m²     Body mass index is 39.92 kg/m².    Physical Exam    Right ankle: Incision is well-healed, no erythema, no ecchymosis, " no signs of infection, nontender to palpation medial lateral posterior ankle, 2+ dorsalis pedis/posterior tibialis pulses, dorsiflexion 5, plantarflexion 25, stable to inversion/eversion, no pain with range of motion, 2+ capillary refill, patient able to wiggle toes, nontender calf, negative Homans' sign.    Procedures    Imaging Results (Most Recent)     Procedure Component Value Units Date/Time    XR Ankle 3+ View Right [[id number]] Resulted: 07/12/21 1625     Updated: 07/12/21 1628    Narrative:      • View:AP and Lateral view(s)  • Site: Right ankle  • Indication: Right ankle pain  • Study: X-rays ordered, taken in the office, and reviewed today  • X-ray: Good healing of fracture, intact hardware, no increased   displacement or angulation, ankle mortise intact.  • Comparative data: No comparative data found             Result Review :   The following data was reviewed by: CHESTER Jewell on 07/12/2021:  Data reviewed: Radiologic studies Reviewed by me with the patient             Assessment and Plan    Diagnoses and all orders for this visit:    1. Aftercare following surgery of ORIF right bimalleolar ankle fracture, 5/26/2021 (Primary)  -     XR Ankle 3+ View Right  -     Ambulatory Referral to Physical Therapy Evaluate and treat (2-3 times a week for 6 to 8 weeks)    2. Aftercare following surgery of the musculoskeletal system  -     XR Ankle 3+ View Right  -     Ambulatory Referral to Physical Therapy Evaluate and treat (2-3 times a week for 6 to 8 weeks)        Reviewed x-rays with the patient, patient was advised that he may wean out of the crutches under supervision of physical therapy, I advised him to continue walking boot use with activities outside of the home, we discussed increasing weightbearing without the boot under therapy supervision.  New order for physical therapy was given, follow-up in 4 weeks with x-rays.    Call or return if worsening symptoms.    Follow Up   Return in about 4  weeks (around 8/9/2021).  Patient was given instructions and counseling regarding his condition or for health maintenance advice. Please see specific information pulled into the AVS if appropriate.

## 2021-07-13 ENCOUNTER — TREATMENT (OUTPATIENT)
Dept: PHYSICAL THERAPY | Facility: CLINIC | Age: 51
End: 2021-07-13

## 2021-07-13 DIAGNOSIS — Z98.890 S/P ORIF (OPEN REDUCTION INTERNAL FIXATION) FRACTURE: ICD-10-CM

## 2021-07-13 DIAGNOSIS — M25.671 DECREASED RANGE OF MOTION OF RIGHT ANKLE: ICD-10-CM

## 2021-07-13 DIAGNOSIS — R29.898 ANKLE WEAKNESS: ICD-10-CM

## 2021-07-13 DIAGNOSIS — Z87.81 S/P ORIF (OPEN REDUCTION INTERNAL FIXATION) FRACTURE: ICD-10-CM

## 2021-07-13 DIAGNOSIS — M25.571 ACUTE RIGHT ANKLE PAIN: Primary | ICD-10-CM

## 2021-07-13 PROCEDURE — 97110 THERAPEUTIC EXERCISES: CPT | Performed by: PHYSICAL THERAPIST

## 2021-07-13 NOTE — PROGRESS NOTES
Physical Therapy Daily Progress Note    Patient: Christian Copeland   : 1970  Diagnosis/ICD-10 Code:  Acute right ankle pain [M25.571]  Referring practitioner: Christian Fish MD  Date of Initial Visit: Type: THERAPY  Noted: 2021  Today's Date: 2021  Patient seen for 7 sessions           Subjective   The patient reported that he had a follow up visit with orthopedics yesterday and he was cleared to discontinue crutches, but still use the walking boot when outside of PT for 4 more weeks. He can use a shoe for ambulation while in PT.    Objective   See Exercise, Manual, and Modality Logs for complete treatment.     Assessment/Plan  The patient demonstrated good tolerance to the progression of PT to include full weight bearing exercise in a shoe. He experienced minimal ankle pain with exercise progression today. He demonstrates a mildly antalgic gait pattern consisting of shorter stance time on his right lower and short step length when advancing his left lower extremity. Continue with current PT plan of care.         Manual Therapy:    0     mins  70543;  Therapeutic Exercise:    28     mins  77617;     Neuromuscular Sachi:    0    mins  00723;    Therapeutic Activity:     0     mins  12121;     Gait Trainin     mins  25909;     Ultrasound:     0     mins  20131;    Electrical Stimulation:    0     mins  47324 ( );  Dry Needling     0     mins self-pay    Timed Treatment:   28   mins   Total Treatment:     28   mins    Markos Zimmerman PT  Physical Therapist

## 2021-07-15 ENCOUNTER — TREATMENT (OUTPATIENT)
Dept: PHYSICAL THERAPY | Facility: CLINIC | Age: 51
End: 2021-07-15

## 2021-07-15 DIAGNOSIS — M25.671 DECREASED RANGE OF MOTION OF RIGHT ANKLE: ICD-10-CM

## 2021-07-15 DIAGNOSIS — M25.571 ACUTE RIGHT ANKLE PAIN: Primary | ICD-10-CM

## 2021-07-15 DIAGNOSIS — Z87.81 S/P ORIF (OPEN REDUCTION INTERNAL FIXATION) FRACTURE: ICD-10-CM

## 2021-07-15 DIAGNOSIS — R29.898 ANKLE WEAKNESS: ICD-10-CM

## 2021-07-15 DIAGNOSIS — Z98.890 S/P ORIF (OPEN REDUCTION INTERNAL FIXATION) FRACTURE: ICD-10-CM

## 2021-07-15 PROCEDURE — 97110 THERAPEUTIC EXERCISES: CPT | Performed by: PHYSICAL THERAPIST

## 2021-07-15 NOTE — PROGRESS NOTES
Physical Therapy Daily Progress Note    Patient: Christian Copeland   : 1970  Diagnosis/ICD-10 Code:  Acute right ankle pain [M25.571]  Referring practitioner: Christian Fish MD  Date of Initial Visit: Type: THERAPY  Noted: 2021  Today's Date: 7/15/2021  Patient seen for 8 sessions           Subjective   The patient reported that his ankle wasn't too swollen after his last visit. He feels like his walking has improved.    Objective   See Exercise, Manual, and Modality Logs for complete treatment.     Assessment/Plan  The patient demonstrated good tolerance to exercise progression today. He demonstrated improved gait mechanics. Continue with current PT plan of care.       Manual Therapy:    0     mins  25718;  Therapeutic Exercise:    28     mins  18786;     Neuromuscular Sachi:    0    mins  24154;    Therapeutic Activity:     0     mins  19410;     Gait Trainin     mins  62596;     Ultrasound:     0     mins  53628;    Electrical Stimulation:    0     mins  97396 ( );  Dry Needling     0     mins self-pay    Timed Treatment:   28   mins   Total Treatment:     28   mins    Markos Zimmerman PT  Physical Therapist

## 2021-07-20 ENCOUNTER — TREATMENT (OUTPATIENT)
Dept: PHYSICAL THERAPY | Facility: CLINIC | Age: 51
End: 2021-07-20

## 2021-07-20 DIAGNOSIS — R29.898 ANKLE WEAKNESS: ICD-10-CM

## 2021-07-20 DIAGNOSIS — Z87.81 S/P ORIF (OPEN REDUCTION INTERNAL FIXATION) FRACTURE: ICD-10-CM

## 2021-07-20 DIAGNOSIS — Z98.890 S/P ORIF (OPEN REDUCTION INTERNAL FIXATION) FRACTURE: ICD-10-CM

## 2021-07-20 DIAGNOSIS — M25.571 ACUTE RIGHT ANKLE PAIN: Primary | ICD-10-CM

## 2021-07-20 DIAGNOSIS — M25.671 DECREASED RANGE OF MOTION OF RIGHT ANKLE: ICD-10-CM

## 2021-07-20 PROCEDURE — 97110 THERAPEUTIC EXERCISES: CPT | Performed by: PHYSICAL THERAPIST

## 2021-07-20 NOTE — PROGRESS NOTES
Progress Note      Patient: Christian Copeland   : 1970  Diagnosis/ICD-10 Code:  Acute right ankle pain [M25.571]  Referring practitioner: Christian Fish MD  Date of Initial Visit: Type: THERAPY  Noted: 2021  Today's Date: 2021  Patient seen for 9 sessions      Subjective:   Subjective Questionnaire: LEFS: 35/80 = 43.75% Function  Clinical Progress: unchanged  Home Program Compliance: Yes  Treatment has included: therapeutic exercise and gait training    Subjective     Objective          Tenderness     Additional Tenderness Details  Mild tenderness noted over dorsum of right foot    Neurological Testing     Sensation     Ankle/Foot     Right Ankle/Foot   Intact: light touch     Active Range of Motion   Left Ankle/Foot   Dorsiflexion (ke): 12 degrees   Plantar flexion: 40 degrees   Inversion: 40 degrees   Eversion: 15 degrees     Right Ankle/Foot   Dorsiflexion (ke): 10 degrees   Plantar flexion: 30 degrees   Inversion: 35 degrees   Eversion: 18 degrees     Passive Range of Motion     Right Ankle/Foot    Dorsiflexion (ke): 12 degrees   Plantar flexion: 35 degrees   Inversion: 35 degrees   Eversion: 25 degrees     Joint Play     Right Ankle/Foot  Joints within functional limits are the midfoot and forefoot. Hypomobile in the talocrural joint and subtalar joint.     Strength/Myotome Testing     Left Knee   Flexion: 5  Extension: 5    Right Knee   Flexion: 5  Extension: 5    Left Ankle/Foot   Dorsiflexion: 5  Plantar flexion: 5  Inversion: 5  Eversion: 5    Right Ankle/Foot   Dorsiflexion: 5  Plantar flexion: 4  Inversion: 5  Eversion: 5    Ambulation   Weight-Bearing Status   Weight-Bearing Status (Right): weight-bearing as tolerated      Ambulation: Level Surfaces     Additional Level Surfaces Ambulation Details  The patient ambulates without an AD with normal shoes. He demonstrates slightly decreased stance time on his right lower extremity and slight toeing out of his right lower extremity  throughout the gait cycle.      General Comments     Ankle/Foot Comments   Edema through R foot and ankle     See Exercise, Manual, and Modality Logs for complete treatment.     Assessment & Plan     Assessment  Impairments: abnormal gait, abnormal or restricted ROM, impaired balance, impaired physical strength and pain with function  Assessment details: The patient was re-evaluated today and presents with improvements in ankle strength, ankle ROM, gait, and functional mobility (LEFS). He continues to present with mild deficits in ankle strength and ROM which impact his gait mechanics and functional mobility. He would benefit from continued physical therapy at this time to assist in the patient returning to his prior level of function.     Prognosis: good  Functional Limitations: walking, standing and stooping  Goals  Plan Goals: 1. The patient has limited ROM for the R ankle.   LTG 1: 12 weeks:  In order to allow the patient greater ease with forward, lateral, and diagonal mobility the patient will demonstrate improved ROM of the R ankle as follows:  10 degrees of dorsiflexion and 40 degrees of plantarflexion.  STATUS:  Progressing  STG 1a: 6 weeks:  The patient will demonstrate improved ROM of the R ankle as follows:  9 degrees of dorsiflexion and 30 degrees of plantarflexion.  STATUS:  Met     2. The patient has limited strength of the R ankle.   LTG 2: 12 weeks:  In order to provide greater joint stability of the R ankle the patient will demonstrate improved strength of the R ankle as follows:  5/5 dorsiflexion, 5/5 inversion, 5/5 eversion, and 5/5 plantar flexion.   STATUS:  Progressing   STG 2a: 6 weeks:  The patient will demonstrate improved strength of the R ankle as follows:  5/5 dorsiflexion, 5/5 inversion, 5/5 eversion, and 4+/5 plantar flexion.   STATUS:  Progressing      3. The patient has gait dysfunction.   LTG 3: 12 weeks:  The patient will ambulate without assistive device, independently, for  community distances with minimal limp to the R lower extremity in order to improve mobility and allow patient to perform activities such as grocery shopping with greater ease.   STATUS:  Progressing  STG 3a: The patient will be independent in HEP.   STATUS:  Met     4. Mobility: Walking/Moving Around Functional Limitation     LTG 4: 12 weeks:  The patient will demonstrate 20-39%% limitation by achieving a score of 55 on the Lower Extremity Functional Scale.   STATUS: Progressing  STG 4a: 6 weeks:  The patient will demonstrate 40-59% limitation by achieving a score of 45 on the Lower Extremity Functional Scale.     STATUS:  Progressing    TREATMENT: Manual therapy, therapeutic exercise, home exercise instruction, and modalities as needed to include:  moist heat, electrical stimulation, ultrasound, and ice.     Plan  Therapy options: will be seen for skilled physical therapy services  Planned modality interventions: cryotherapy, dry needling, electrical stimulation/T[place]ese stimulation, ultrasound and hydrotherapy  Planned therapy interventions: flexibility, functional ROM exercises, home exercise program, joint mobilization, manual therapy, neuromuscular re-education, soft tissue mobilization, spinal/joint mobilization, strengthening, stretching, balance/weight-bearing training, gait training and therapeutic activities  Frequency: 2x week  Duration in weeks: 4  Treatment plan discussed with: patient      Progress toward previous goals: Partially Met    Recommendations: Continue as planned  Timeframe: 1 month  Prognosis to achieve goals: good    PT Signature: Markos Zimmerman PT      Based upon review of the patient's progress and continued therapy plan, it is my medical opinion that Christian Copeland should continue physical therapy treatment at [facility]  [address]  [place] [address] [phone].    Signature: __________________________________  Christian Fish  MD      Please sign and return via fax to [phone]. Thank you, [facility].    Manual Therapy:    0     mins  60541;  Therapeutic Exercise:    30     mins  58442;     Neuromuscular Sachi:    0    mins  00144;    Therapeutic Activity:     0     mins  15242;     Gait Trainin     mins  90540;     Ultrasound:     0     mins  00632;    Electrical Stimulation:    0     mins  32036 ( );  Dry Needling     0     mins self-pay    Timed Treatment:   30   mins   Total Treatment:     30   mins

## 2021-07-22 ENCOUNTER — TREATMENT (OUTPATIENT)
Dept: PHYSICAL THERAPY | Facility: CLINIC | Age: 51
End: 2021-07-22

## 2021-07-22 DIAGNOSIS — Z98.890 S/P ORIF (OPEN REDUCTION INTERNAL FIXATION) FRACTURE: ICD-10-CM

## 2021-07-22 DIAGNOSIS — R29.898 ANKLE WEAKNESS: ICD-10-CM

## 2021-07-22 DIAGNOSIS — M25.571 ACUTE RIGHT ANKLE PAIN: Primary | ICD-10-CM

## 2021-07-22 DIAGNOSIS — M25.671 DECREASED RANGE OF MOTION OF RIGHT ANKLE: ICD-10-CM

## 2021-07-22 DIAGNOSIS — Z87.81 S/P ORIF (OPEN REDUCTION INTERNAL FIXATION) FRACTURE: ICD-10-CM

## 2021-07-22 PROCEDURE — 97110 THERAPEUTIC EXERCISES: CPT | Performed by: PHYSICAL THERAPIST

## 2021-07-22 NOTE — PROGRESS NOTES
"   Physical Therapy Daily Progress Note    Patient: Christian Copeland   : 1970  Diagnosis/ICD-10 Code:  Acute right ankle pain [M25.571]  Referring practitioner: Christian Fish MD  Date of Initial Visit: Type: THERAPY  Noted: 2021  Today's Date: 2021  Patient seen for 10 sessions           Subjective   The patient reported that his ankle isn't really hurting, but just feels \"tight\".    Objective   See Exercise, Manual, and Modality Logs for complete treatment.     Assessment/Plan  The patient demonstrated good tolerance to all PT interventions today. His balance is consistently improving. Continue with current PT POC.       Manual Therapy:    0     mins  90274;  Therapeutic Exercise:    27     mins  29895;     Neuromuscular Sachi:    0    mins  69221;    Therapeutic Activity:     0     mins  18432;     Gait Trainin     mins  29135;     Ultrasound:     0     mins  89408;    Electrical Stimulation:    0     mins  97259 (MC );  Dry Needling     0     mins self-pay    Timed Treatment:   27   mins   Total Treatment:     27   mins    Markos Zimmerman PT  Physical Therapist                     "

## 2021-07-26 ENCOUNTER — HOSPITAL ENCOUNTER (EMERGENCY)
Facility: HOSPITAL | Age: 51
Discharge: HOME | End: 2021-07-27
Attending: EMERGENCY MEDICINE | Admitting: EMERGENCY MEDICINE

## 2021-07-26 DIAGNOSIS — K29.00 ACUTE GASTRITIS WITHOUT HEMORRHAGE, UNSPECIFIED GASTRITIS TYPE: ICD-10-CM

## 2021-07-26 DIAGNOSIS — R11.14 BILIOUS VOMITING WITH NAUSEA: Primary | ICD-10-CM

## 2021-07-26 LAB
ALBUMIN SERPL-MCNC: 4.1 G/DL (ref 3.5–5.2)
ALBUMIN/GLOB SERPL: 1.4 G/DL
ALP SERPL-CCNC: 120 U/L (ref 39–117)
ALT SERPL W P-5'-P-CCNC: 21 U/L (ref 1–41)
ANION GAP SERPL CALCULATED.3IONS-SCNC: 13.1 MMOL/L (ref 5–15)
AST SERPL-CCNC: 19 U/L (ref 1–40)
BASOPHILS # BLD AUTO: 0.04 10*3/MM3 (ref 0–0.2)
BASOPHILS NFR BLD AUTO: 0.4 % (ref 0–1.5)
BILIRUB SERPL-MCNC: 0.6 MG/DL (ref 0–1.2)
BUN SERPL-MCNC: 14 MG/DL (ref 6–20)
BUN/CREAT SERPL: 15.4 (ref 7–25)
CALCIUM SPEC-SCNC: 9.2 MG/DL (ref 8.6–10.5)
CHLORIDE SERPL-SCNC: 103 MMOL/L (ref 98–107)
CO2 SERPL-SCNC: 22.9 MMOL/L (ref 22–29)
CREAT SERPL-MCNC: 0.91 MG/DL (ref 0.76–1.27)
DEPRECATED RDW RBC AUTO: 43 FL (ref 37–54)
EOSINOPHIL # BLD AUTO: 0.01 10*3/MM3 (ref 0–0.4)
EOSINOPHIL NFR BLD AUTO: 0.1 % (ref 0.3–6.2)
ERYTHROCYTE [DISTWIDTH] IN BLOOD BY AUTOMATED COUNT: 13.3 % (ref 12.3–15.4)
GFR SERPL CREATININE-BSD FRML MDRD: 88 ML/MIN/1.73
GLOBULIN UR ELPH-MCNC: 3 GM/DL
GLUCOSE SERPL-MCNC: 126 MG/DL (ref 65–99)
HCT VFR BLD AUTO: 46.8 % (ref 37.5–51)
HGB BLD-MCNC: 15.2 G/DL (ref 13–17.7)
HOLD SPECIMEN: NORMAL
HOLD SPECIMEN: NORMAL
IMM GRANULOCYTES # BLD AUTO: 0.03 10*3/MM3 (ref 0–0.05)
IMM GRANULOCYTES NFR BLD AUTO: 0.3 % (ref 0–0.5)
LIPASE SERPL-CCNC: 11 U/L (ref 13–60)
LYMPHOCYTES # BLD AUTO: 1.04 10*3/MM3 (ref 0.7–3.1)
LYMPHOCYTES NFR BLD AUTO: 10.8 % (ref 19.6–45.3)
MCH RBC QN AUTO: 28.8 PG (ref 26.6–33)
MCHC RBC AUTO-ENTMCNC: 32.5 G/DL (ref 31.5–35.7)
MCV RBC AUTO: 88.6 FL (ref 79–97)
MONOCYTES # BLD AUTO: 0.38 10*3/MM3 (ref 0.1–0.9)
MONOCYTES NFR BLD AUTO: 3.9 % (ref 5–12)
NEUTROPHILS NFR BLD AUTO: 8.15 10*3/MM3 (ref 1.7–7)
NEUTROPHILS NFR BLD AUTO: 84.5 % (ref 42.7–76)
NRBC BLD AUTO-RTO: 0 /100 WBC (ref 0–0.2)
PLATELET # BLD AUTO: 318 10*3/MM3 (ref 140–450)
PMV BLD AUTO: 9.3 FL (ref 6–12)
POTASSIUM SERPL-SCNC: 4 MMOL/L (ref 3.5–5.2)
PROT SERPL-MCNC: 7.1 G/DL (ref 6–8.5)
RBC # BLD AUTO: 5.28 10*6/MM3 (ref 4.14–5.8)
SODIUM SERPL-SCNC: 139 MMOL/L (ref 136–145)
WBC # BLD AUTO: 9.65 10*3/MM3 (ref 3.4–10.8)
WHOLE BLOOD HOLD SPECIMEN: NORMAL

## 2021-07-26 PROCEDURE — 85025 COMPLETE CBC W/AUTO DIFF WBC: CPT

## 2021-07-26 PROCEDURE — 80053 COMPREHEN METABOLIC PANEL: CPT

## 2021-07-26 PROCEDURE — 99283 EMERGENCY DEPT VISIT LOW MDM: CPT

## 2021-07-26 PROCEDURE — 36415 COLL VENOUS BLD VENIPUNCTURE: CPT

## 2021-07-26 PROCEDURE — 83690 ASSAY OF LIPASE: CPT

## 2021-07-26 RX ORDER — SODIUM CHLORIDE 0.9 % (FLUSH) 0.9 %
10 SYRINGE (ML) INJECTION AS NEEDED
Status: DISCONTINUED | OUTPATIENT
Start: 2021-07-26 | End: 2021-07-27 | Stop reason: HOSPADM

## 2021-07-27 ENCOUNTER — TELEPHONE (OUTPATIENT)
Dept: PHYSICAL THERAPY | Facility: CLINIC | Age: 51
End: 2021-07-27

## 2021-07-27 VITALS
WEIGHT: 297.62 LBS | RESPIRATION RATE: 22 BRPM | OXYGEN SATURATION: 98 % | HEART RATE: 76 BPM | HEIGHT: 73 IN | BODY MASS INDEX: 39.44 KG/M2 | TEMPERATURE: 98.2 F | DIASTOLIC BLOOD PRESSURE: 92 MMHG | SYSTOLIC BLOOD PRESSURE: 132 MMHG

## 2021-07-27 PROCEDURE — 96374 THER/PROPH/DIAG INJ IV PUSH: CPT

## 2021-07-27 PROCEDURE — 25010000002 ONDANSETRON PER 1 MG: Performed by: EMERGENCY MEDICINE

## 2021-07-27 PROCEDURE — 96375 TX/PRO/DX INJ NEW DRUG ADDON: CPT

## 2021-07-27 RX ORDER — FAMOTIDINE 10 MG/ML
20 INJECTION, SOLUTION INTRAVENOUS ONCE
Status: COMPLETED | OUTPATIENT
Start: 2021-07-27 | End: 2021-07-27

## 2021-07-27 RX ORDER — ONDANSETRON 2 MG/ML
4 INJECTION INTRAMUSCULAR; INTRAVENOUS ONCE
Status: COMPLETED | OUTPATIENT
Start: 2021-07-27 | End: 2021-07-27

## 2021-07-27 RX ORDER — ONDANSETRON 8 MG/1
8 TABLET, ORALLY DISINTEGRATING ORAL EVERY 8 HOURS PRN
Qty: 30 TABLET | Refills: 0 | Status: SHIPPED | OUTPATIENT
Start: 2021-07-27 | End: 2021-08-06

## 2021-07-27 RX ORDER — SODIUM CHLORIDE 0.9 % (FLUSH) 0.9 %
10 SYRINGE (ML) INJECTION AS NEEDED
Status: DISCONTINUED | OUTPATIENT
Start: 2021-07-27 | End: 2021-07-27 | Stop reason: HOSPADM

## 2021-07-27 RX ORDER — FAMOTIDINE 20 MG/1
20 TABLET, FILM COATED ORAL 2 TIMES DAILY
Qty: 30 TABLET | Refills: 0 | Status: SHIPPED | OUTPATIENT
Start: 2021-07-27 | End: 2021-08-11

## 2021-07-27 RX ADMIN — SODIUM CHLORIDE 1000 ML: 9 INJECTION, SOLUTION INTRAVENOUS at 00:24

## 2021-07-27 RX ADMIN — SODIUM CHLORIDE 1000 ML: 9 INJECTION, SOLUTION INTRAVENOUS at 01:35

## 2021-07-27 RX ADMIN — FAMOTIDINE 20 MG: 10 INJECTION INTRAVENOUS at 00:27

## 2021-07-27 RX ADMIN — ONDANSETRON 4 MG: 2 INJECTION INTRAMUSCULAR; INTRAVENOUS at 00:26

## 2021-08-03 ENCOUNTER — TREATMENT (OUTPATIENT)
Dept: PHYSICAL THERAPY | Facility: CLINIC | Age: 51
End: 2021-08-03

## 2021-08-03 DIAGNOSIS — Z98.890 S/P ORIF (OPEN REDUCTION INTERNAL FIXATION) FRACTURE: ICD-10-CM

## 2021-08-03 DIAGNOSIS — Z87.81 S/P ORIF (OPEN REDUCTION INTERNAL FIXATION) FRACTURE: ICD-10-CM

## 2021-08-03 DIAGNOSIS — R29.898 ANKLE WEAKNESS: ICD-10-CM

## 2021-08-03 DIAGNOSIS — M25.671 DECREASED RANGE OF MOTION OF RIGHT ANKLE: ICD-10-CM

## 2021-08-03 DIAGNOSIS — M25.571 ACUTE RIGHT ANKLE PAIN: Primary | ICD-10-CM

## 2021-08-03 PROCEDURE — 97110 THERAPEUTIC EXERCISES: CPT | Performed by: PHYSICAL THERAPIST

## 2021-08-03 NOTE — PROGRESS NOTES
Physical Therapy Daily Progress Note    Patient: Christian Copeland   : 1970  Diagnosis/ICD-10 Code:  Acute right ankle pain [M25.571]  Referring practitioner: Christian Fish MD  Date of Initial Visit: Type: THERAPY  Noted: 2021  Today's Date: 8/3/2021  Patient seen for 11 sessions           Subjective   The patient reported that he did a lot of walking over the weekend and his ankle was a little sore and swollen afterwards.     Objective   See Exercise, Manual, and Modality Logs for complete treatment.     Assessment/Plan  The patient demonstrated good tolerance to all PT interventions today. His gait is improving, but continues to be limited by ankle dorsiflexion stiffness. Continue to progress as tolerated.       Manual Therapy:    0     mins  65665;  Therapeutic Exercise:    30     mins  44388;     Neuromuscular Sachi:    0    mins  63366;    Therapeutic Activity:     0     mins  61823;     Gait Trainin     mins  76443;     Ultrasound:     0     mins  58676;    Electrical Stimulation:    0     mins  65105 ( );  Dry Needling     0     mins self-pay    Timed Treatment:   30   mins   Total Treatment:     30   mins    Markos Zimmerman PT  Physical Therapist

## 2021-08-05 ENCOUNTER — TREATMENT (OUTPATIENT)
Dept: PHYSICAL THERAPY | Facility: CLINIC | Age: 51
End: 2021-08-05

## 2021-08-05 DIAGNOSIS — R29.898 ANKLE WEAKNESS: ICD-10-CM

## 2021-08-05 DIAGNOSIS — M25.571 ACUTE RIGHT ANKLE PAIN: Primary | ICD-10-CM

## 2021-08-05 DIAGNOSIS — Z87.81 S/P ORIF (OPEN REDUCTION INTERNAL FIXATION) FRACTURE: ICD-10-CM

## 2021-08-05 DIAGNOSIS — Z98.890 S/P ORIF (OPEN REDUCTION INTERNAL FIXATION) FRACTURE: ICD-10-CM

## 2021-08-05 DIAGNOSIS — M25.671 DECREASED RANGE OF MOTION OF RIGHT ANKLE: ICD-10-CM

## 2021-08-05 PROCEDURE — 97110 THERAPEUTIC EXERCISES: CPT | Performed by: PHYSICAL THERAPIST

## 2021-08-05 NOTE — PROGRESS NOTES
Physical Therapy Daily Progress Note    Patient: Christian Copeland   : 1970  Diagnosis/ICD-10 Code:  Acute right ankle pain [M25.571]  Referring practitioner: Christian Fish MD  Date of Initial Visit: Type: THERAPY  Noted: 2021  Today's Date: 2021  Patient seen for 12 sessions           Subjective   The patient reported that he has been more active lately and his ankle has been swelling a little more with increased activity. Overall, his ankle feels like it is improving.    Objective   See Exercise, Manual, and Modality Logs for complete treatment.     Assessment/Plan  The patient demonstrated good tolerance to all PT interventions today. Continue with current PT plan of care.        Manual Therapy:    0     mins  43983;  Therapeutic Exercise:    26     mins  38253;     Neuromuscular Sachi:    0    mins  89249;    Therapeutic Activity:     0     mins  08179;     Gait Trainin     mins  04470;     Ultrasound:     0     mins  52657;    Electrical Stimulation:    0     mins  89414 ( );  Dry Needling     0     mins self-pay    Timed Treatment:   26   mins   Total Treatment:     26   mins    Markos Zimmerman PT  Physical Therapist

## 2021-08-09 ENCOUNTER — OFFICE VISIT (OUTPATIENT)
Dept: ORTHOPEDIC SURGERY | Facility: CLINIC | Age: 51
End: 2021-08-09

## 2021-08-09 VITALS — OXYGEN SATURATION: 97 % | HEIGHT: 73 IN | HEART RATE: 70 BPM | WEIGHT: 301.8 LBS | BODY MASS INDEX: 40 KG/M2

## 2021-08-09 DIAGNOSIS — Z47.89 AFTERCARE FOLLOWING SURGERY OF THE MUSCULOSKELETAL SYSTEM: Primary | ICD-10-CM

## 2021-08-09 DIAGNOSIS — Z47.89 AFTERCARE FOLLOWING SURGERY OF THE MUSCULOSKELETAL SYSTEM: ICD-10-CM

## 2021-08-09 PROCEDURE — 99213 OFFICE O/P EST LOW 20 MIN: CPT | Performed by: PHYSICIAN ASSISTANT

## 2021-08-09 RX ORDER — ESCITALOPRAM OXALATE 5 MG/1
5 TABLET ORAL EVERY MORNING
COMMUNITY
Start: 2021-06-08

## 2021-08-09 NOTE — PROGRESS NOTES
"Chief Complaint  Pain and Follow-up of the Right Ankle    Subjective          Christian Copeland is a 50 y.o. male  presents to [facility] for   History of Present Illness    Patient presents for follow-up evaluation of ORIF right bimalleolar ankle fracture, 5/26/2021.  Patient presents without walking boot, presents in regular walking shoes and no assistive walking devices.  Patient states he has been working with physical therapy nonweightbearing, he attends physical therapy twice a week with good results, patient states he has no need for pain medication or NSAIDs, he gets some swelling to the ankle with increased activity.  Patient states he went to a [employer] museum in [facility], he states he was able to tour the facility without any complications or pain.  Patient also went to an event where they walked a lot and he states he was able to tolerate this well wearing his walking boot.  Patient denies new injury, denies new symptoms of pain, denies numbness and tingling..       No Known Allergies     Social History     Socioeconomic History   • Marital status:      Spouse name: Not on file   • Number of children: Not on file   • Years of education: Not on file   • Highest education level: Not on file   Tobacco Use   • Smoking status: Never Smoker   • Smokeless tobacco: Never Used   Vaping Use   • Vaping Use: Never used   Substance and Sexual Activity   • Alcohol use: Yes     Comment: occasion   • Drug use: Never   • Sexual activity: Defer        REVIEW OF SYSTEMS    Constitutional: Denies fevers, chills, weight loss  Cardiovascular: Denies chest pain, shortness of breath  Skin: Denies rashes, acute skin changes  Neurologic: Denies headache, loss of consciousness  MSK: Right ankle pain      Objective   Vital Signs:   Pulse 70   Ht 185.4 cm (73\")   Wt (!) 137 kg (301 lb 12.8 oz)   SpO2 97%   BMI 39.82 kg/m²     Body mass index is 39.82 kg/m².    Physical Exam    Right " ankle: Incision is well-healed, no erythema, ecchymosis, no swelling, no signs of infection.  2+ dorsalis pedis/posterior tibialis pulses, 2+ capillary refill, sensation intact to light touch, nontender to palpation, no pain with range of motion, dorsiflexion 10, plantarflexion 35, ankle is stable.    Procedures    Imaging Results (Most Recent)     Procedure Component Value Units Date/Time    XR Ankle 2 View Right [[id number]] Resulted: 08/09/21 1550     Updated: 08/09/21 1551    Narrative:      • View:AP and Lateral view(s)  • Site: Right ankle  • Indication: Right ankle pain  • Study: X-rays ordered, taken in the office, and reviewed today  • X-ray: Intact plate and screws to the distal fibula, ankle mortise is   intact, healing fracture, fracture line still visible on lateral view..  • Comparative data: No comparative data found             Result Review :   The following data was reviewed by: CHESTER Jewell on 08/09/2021:  Data reviewed: Radiologic studies Reviewed by me with the patient.             Assessment and Plan    Diagnoses and all orders for this visit:    1. Aftercare following surgery of ORIF right bimalleolar ankle fracture, 5/26/2021 (Primary)  -     XR Ankle 2 View Right  -     Ambulatory Referral to Physical Therapy Evaluate and treat (2-3 times per week for 6 to 8 weeks weeks)    2. Aftercare following surgery of the musculoskeletal system  -     XR Ankle 2 View Right  -     Ambulatory Referral to Physical Therapy Evaluate and treat (2-3 times per week for 6 to 8 weeks weeks)        Reviewed x-rays with the patient, advised him we recommend continuing physical therapy new orders written, continue weightbearing as tolerated, follow-up in 4 weeks with x-rays, may consider bone stimulator.    Call or return if worsening symptoms.    Follow Up   Return in about 4 weeks (around 9/6/2021) for Recheck.  Patient was given instructions and counseling regarding his condition or for health  maintenance advice. Please see specific information pulled into the AVS if appropriate.

## 2021-08-10 ENCOUNTER — TREATMENT (OUTPATIENT)
Dept: PHYSICAL THERAPY | Facility: CLINIC | Age: 51
End: 2021-08-10

## 2021-08-10 DIAGNOSIS — Z87.81 S/P ORIF (OPEN REDUCTION INTERNAL FIXATION) FRACTURE: ICD-10-CM

## 2021-08-10 DIAGNOSIS — M25.571 ACUTE RIGHT ANKLE PAIN: Primary | ICD-10-CM

## 2021-08-10 DIAGNOSIS — M25.671 DECREASED RANGE OF MOTION OF RIGHT ANKLE: ICD-10-CM

## 2021-08-10 DIAGNOSIS — R29.898 ANKLE WEAKNESS: ICD-10-CM

## 2021-08-10 DIAGNOSIS — Z98.890 S/P ORIF (OPEN REDUCTION INTERNAL FIXATION) FRACTURE: ICD-10-CM

## 2021-08-10 PROCEDURE — 97110 THERAPEUTIC EXERCISES: CPT | Performed by: PHYSICAL THERAPIST

## 2021-08-10 NOTE — PROGRESS NOTES
Physical Therapy Daily Progress Note    Patient: Christian Copeland   : 1970  Diagnosis/ICD-10 Code:  Acute right ankle pain [M25.571]  Referring practitioner: Christian Fish MD  Date of Initial Visit: Type: THERAPY  Noted: 2021  Today's Date: 8/10/2021  Patient seen for 13 sessions           Subjective   The patient reported that his ankle pain is a 2-3/10 today. He had his follow up visit with ortho and his fracture still isn't fully healed. He was referred for another month of PT. They may consider using a bone stimulator to promote healing.    Objective   See Exercise, Manual, and Modality Logs for complete treatment.     Assessment/Plan  The patient demonstrated good tolerance to all interventions today. Continue to progress strength and balance per patient tolerance.       Manual Therapy:    0     mins  10019;  Therapeutic Exercise:    23    mins  08194;     Neuromuscular Sachi:    0    mins  97511;    Therapeutic Activity:     0     mins  36458;     Gait Trainin     mins  53727;     Ultrasound:     0     mins  72774;    Electrical Stimulation:    0     mins  70098 ( );  Dry Needling     0     mins self-pay    Timed Treatment:   23   mins   Total Treatment:     23   mins    Markos Zimmerman PT  Physical Therapist

## 2021-08-12 ENCOUNTER — TREATMENT (OUTPATIENT)
Dept: PHYSICAL THERAPY | Facility: CLINIC | Age: 51
End: 2021-08-12

## 2021-08-12 DIAGNOSIS — Z98.890 S/P ORIF (OPEN REDUCTION INTERNAL FIXATION) FRACTURE: ICD-10-CM

## 2021-08-12 DIAGNOSIS — Z87.81 S/P ORIF (OPEN REDUCTION INTERNAL FIXATION) FRACTURE: ICD-10-CM

## 2021-08-12 DIAGNOSIS — M25.571 ACUTE RIGHT ANKLE PAIN: Primary | ICD-10-CM

## 2021-08-12 DIAGNOSIS — M25.671 DECREASED RANGE OF MOTION OF RIGHT ANKLE: ICD-10-CM

## 2021-08-12 DIAGNOSIS — R29.898 ANKLE WEAKNESS: ICD-10-CM

## 2021-08-12 PROCEDURE — 97110 THERAPEUTIC EXERCISES: CPT | Performed by: PHYSICAL THERAPIST

## 2021-08-12 NOTE — PROGRESS NOTES
Re-Assessment / Re-Certification      Patient: Christian Copeland   : 1970  Diagnosis/ICD-10 Code:  Acute right ankle pain [M25.571]  Referring practitioner: Domingo Marcum*  Date of Initial Visit: Type: THERAPY  Noted: 2021  Today's Date: 2021  Patient seen for 14 sessions      Subjective:   Christian Copeland reports:right ankle pain 2-3/10.  Subjective Questionnaire: LEFS: 41/80=49% limitation  Clinical Progress: improved  Home Program Compliance: Yes  Treatment has included: therapeutic exercise and manual therapy    Subjective Evaluation    History of Present Illness    Subjective comment: Patient reports still having some decrease in ambulation and balance on right ankle.      Objective          Active Range of Motion     Right Ankle/Foot   Dorsiflexion (ke): 14 degrees   Plantar flexion: 35 degrees   Inversion: 35 degrees   Eversion: 18 degrees     Strength/Myotome Testing     Right Ankle/Foot   Dorsiflexion: 5  Plantar flexion: 4+  Inversion: 5  Eversion: 5      Assessment & Plan     Assessment  Impairments: abnormal gait, abnormal or restricted ROM, activity intolerance and impaired physical strength  Prognosis: good  Prognosis details: Patient continues to present with mild deficits in ankle strength and ROM which impact his gait mechanics and functional mobility. He would benefit from continued physical therapy at this time to assist in the patient returning to his prior level of function.  Functional Limitations: walking, standing and stooping  Goals  Plan Goals: 1. The patient has limited ROM for the R ankle.   LTG 1: 12 weeks:  In order to allow the patient greater ease with forward, lateral, and diagonal mobility the patient will demonstrate improved ROM of the R ankle as follows:  10 degrees of dorsiflexion and 40 degrees of plantarflexion.  STATUS:  Progressing  STG 1a: 6 weeks:  The patient will demonstrate improved ROM of the R ankle as follows:  9 degrees of dorsiflexion and  30 degrees of plantarflexion.  STATUS:  Met     2. The patient has limited strength of the R ankle.   LTG 2: 12 weeks:  In order to provide greater joint stability of the R ankle the patient will demonstrate improved strength of the R ankle as follows:  5/5 dorsiflexion, 5/5 inversion, 5/5 eversion, and 5/5 plantar flexion.   STATUS:  Progressing   STG 2a: 6 weeks:  The patient will demonstrate improved strength of the R ankle as follows:  5/5 dorsiflexion, 5/5 inversion, 5/5 eversion, and 4+/5 plantar flexion.   STATUS:  Met     3. The patient has gait dysfunction.   LTG 3: 12 weeks:  The patient will ambulate without assistive device, independently, for community distances with minimal limp to the R lower extremity in order to improve mobility and allow patient to perform activities such as grocery shopping with greater ease.   STATUS:  Progressing  STG 3a: The patient will be independent in [facility].   STATUS:  Met     4. Mobility: Walking/Moving Around Functional Limitation                   LTG 4: 12 weeks:  The patient will demonstrate 20-39%% limitation by achieving a score of 55 on the Lower Extremity Functional Scale.   STATUS: Progressing  STG 4a: 6 weeks:  The patient will demonstrate 40-59% limitation by achieving a score of 45 on the Lower Extremity Functional Scale.     STATUS:  Progressing    TREATMENT: Manual therapy, therapeutic exercise, home exercise instruction, and modalities as needed to include:  moist heat, electrical stimulation, ultrasound, and ice.     Plan  Therapy options: will be seen for skilled physical therapy services  Planned therapy interventions: manual therapy, strengthening, stretching, home exercise program, gait training, functional ROM exercises and flexibility  Frequency: 2x week  Duration in weeks: 4  Treatment plan discussed with: patient        Visit Diagnoses:    ICD-10-CM ICD-9-CM   1. Acute right ankle pain  M25.571 719.47     338.19   2. S/P ORIF (open reduction internal  fixation) fracture  Z98.890 V45.89    Z87.81 V15.51   3. Decreased range of motion of right ankle  M25.671 719.57   4. Ankle weakness  R29.898 719.67       Progress toward previous goals: Partially Met      Recommendations: Continue as planned  Timeframe: 1 month  Prognosis to achieve goals: good    PT Signature: Jackie Christensen PT      Based upon review of the patient's progress and continued therapy plan, it is my medical opinion that Christian Copeland should continue physical therapy treatment at [facility]  [address] [place] [address]  [place] [address] [phone].    Signature: __________________________________  Domingo Thompson PA    Timed:  Manual Therapy:         mins  98082;  Therapeutic Exercise:    26     mins  28560;     Neuromuscular Sachi:        mins  61983;    Therapeutic Activity:          mins  28512;     Gait Training:           mins  12328;     Ultrasound:          mins  02085;    Electrical Stimulation:         mins  65189 ( );    Untimed:  Electrical Stimulation:         mins  15637 ( );  Mechanical Traction:         mins  46887;     Timed Treatment:   26   mins   Total Treatment:     26   mins

## 2021-08-17 ENCOUNTER — TREATMENT (OUTPATIENT)
Dept: PHYSICAL THERAPY | Facility: CLINIC | Age: 51
End: 2021-08-17

## 2021-08-17 DIAGNOSIS — M25.571 ACUTE RIGHT ANKLE PAIN: Primary | ICD-10-CM

## 2021-08-17 DIAGNOSIS — M25.671 DECREASED RANGE OF MOTION OF RIGHT ANKLE: ICD-10-CM

## 2021-08-17 DIAGNOSIS — Z98.890 S/P ORIF (OPEN REDUCTION INTERNAL FIXATION) FRACTURE: ICD-10-CM

## 2021-08-17 DIAGNOSIS — R29.898 ANKLE WEAKNESS: ICD-10-CM

## 2021-08-17 DIAGNOSIS — Z87.81 S/P ORIF (OPEN REDUCTION INTERNAL FIXATION) FRACTURE: ICD-10-CM

## 2021-08-17 PROCEDURE — 97110 THERAPEUTIC EXERCISES: CPT | Performed by: PHYSICAL THERAPIST

## 2021-08-17 NOTE — PROGRESS NOTES
Physical Therapy Daily Progress Note    Patient: Christian Copeland   : 1970  Diagnosis/ICD-10 Code:  Acute right ankle pain [M25.571]  Referring practitioner: Christian Fish MD  Date of Initial Visit: Type: THERAPY  Noted: 2021  Today's Date: 2021  Patient seen for 15 sessions           Subjective   The patient reported that his ankle is feeling better. He is gaining confidence in his ability to walk on uneven surfaces.    Objective   See Exercise, Manual, and Modality Logs for complete treatment.     Assessment/Plan  The patient demonstrated good tolerance to all PT interventions today. His gait mechanics continue to improve. Continue with current PT plan of care.       Timed:  Manual Therapy:    0     mins  63650;  Therapeutic Exercise:    30     mins  63894;     Neuromuscular Sachi:   0    mins  49426;    Therapeutic Activity:     0     mins  26703;     Gait Trainin     mins  66553;     Aquatics                         0      mins  11043    Un-timed:  Mechanical Traction      0     mins  90731  Dry Needling     0     mins self-pay  Electrical Stimulation:    0     mins  10692 ( );      Timed Treatment:   30   mins   Total Treatment:     30   mins    Markos Zimmerman PT  Physical Therapist

## 2021-08-24 ENCOUNTER — TREATMENT (OUTPATIENT)
Dept: PHYSICAL THERAPY | Facility: CLINIC | Age: 51
End: 2021-08-24

## 2021-08-24 DIAGNOSIS — Z98.890 S/P ORIF (OPEN REDUCTION INTERNAL FIXATION) FRACTURE: ICD-10-CM

## 2021-08-24 DIAGNOSIS — M25.571 ACUTE RIGHT ANKLE PAIN: Primary | ICD-10-CM

## 2021-08-24 DIAGNOSIS — Z87.81 S/P ORIF (OPEN REDUCTION INTERNAL FIXATION) FRACTURE: ICD-10-CM

## 2021-08-24 DIAGNOSIS — R29.898 ANKLE WEAKNESS: ICD-10-CM

## 2021-08-24 DIAGNOSIS — M25.671 DECREASED RANGE OF MOTION OF RIGHT ANKLE: ICD-10-CM

## 2021-08-24 PROCEDURE — 97110 THERAPEUTIC EXERCISES: CPT | Performed by: PHYSICAL THERAPIST

## 2021-08-24 NOTE — PROGRESS NOTES
Physical Therapy Daily Progress Note        Patient: Christian Copeland   : 1970  Diagnosis/ICD-10 Code:  Acute right ankle pain [M25.571]  Referring practitioner: Christian Fish MD  Date of Initial Visit: Type: THERAPY  Noted: 2021  Today's Date: 2021  Patient seen for 16 sessions             Subjective   Christian Copeland reports: ankle being a little sore because he was at the beach over the weekend.     Objective   Challenging with single leg balance activities.     See Exercise, Manual, and Modality Logs for complete treatment.       Assessment/Plan  Christian progressing as evident by decreased overall ankle pain, although sore. Pt tolerated exercises well, no complaints of increased pain or discomfort. Pt would benefit from skilled PT to address Range of Motion  and Strength deficits, pain management and any concerns with ADLs.       Progress per Plan of Care           Timed:  Manual Therapy:         mins  54730;  Therapeutic Exercise:    30     mins  78013;     Neuromuscular Sachi:        mins  90656;    Therapeutic Activity:          mins  99545;     Gait Training:           mins  74365;    Aquatic Therapy:          mins  14226;       Untimed:  Electrical Stimulation:         mins  55703 ( );  Mechanical Traction:         mins  04144;       Timed Treatment:   30   mins   Total Treatment:     30   mins        Steph Christianson PTA  Physical Therapist Assistant

## 2021-08-31 ENCOUNTER — TREATMENT (OUTPATIENT)
Dept: PHYSICAL THERAPY | Facility: CLINIC | Age: 51
End: 2021-08-31

## 2021-08-31 DIAGNOSIS — M25.571 ACUTE RIGHT ANKLE PAIN: Primary | ICD-10-CM

## 2021-08-31 DIAGNOSIS — Z98.890 S/P ORIF (OPEN REDUCTION INTERNAL FIXATION) FRACTURE: ICD-10-CM

## 2021-08-31 DIAGNOSIS — R29.898 ANKLE WEAKNESS: ICD-10-CM

## 2021-08-31 DIAGNOSIS — Z87.81 S/P ORIF (OPEN REDUCTION INTERNAL FIXATION) FRACTURE: ICD-10-CM

## 2021-08-31 DIAGNOSIS — M25.671 DECREASED RANGE OF MOTION OF RIGHT ANKLE: ICD-10-CM

## 2021-08-31 PROCEDURE — 97110 THERAPEUTIC EXERCISES: CPT | Performed by: PHYSICAL THERAPIST

## 2021-08-31 NOTE — PROGRESS NOTES
Physical Therapy Daily Progress Note    Patient: Christian Copeland   : 1970  Diagnosis/ICD-10 Code:  Acute right ankle pain [M25.571]  Referring practitioner: Christian Fish MD  Date of Initial Visit: Type: THERAPY  Noted: 2021  Today's Date: 2021  Patient seen for 17 sessions           Subjective   The patient reported that his ankle still hurts if he does too much. He feels like his strength and balance are each improving.    Objective   See Exercise, Manual, and Modality Logs for complete treatment.     Assessment/Plan  The patient demonstrated good tolerance to all PT interventions today. Continue to progress as tolerated.       Timed:  Manual Therapy:    0     mins  91840;  Therapeutic Exercise:    26     mins  16991;     Neuromuscular Sachi:   0    mins  01120;    Therapeutic Activity:     0     mins  74917;     Gait Trainin     mins  53570;     Aquatics                         0      mins  58329    Un-timed:  Mechanical Traction      0     mins  65527  Dry Needling     0     mins self-pay  Electrical Stimulation:    0     mins  13589 ( );      Timed Treatment:   26   mins   Total Treatment:     26   mins    Markos Zimmerman PT  Physical Therapist

## 2021-09-07 ENCOUNTER — TREATMENT (OUTPATIENT)
Dept: PHYSICAL THERAPY | Facility: CLINIC | Age: 51
End: 2021-09-07

## 2021-09-07 DIAGNOSIS — M25.671 DECREASED RANGE OF MOTION OF RIGHT ANKLE: ICD-10-CM

## 2021-09-07 DIAGNOSIS — Z98.890 S/P ORIF (OPEN REDUCTION INTERNAL FIXATION) FRACTURE: ICD-10-CM

## 2021-09-07 DIAGNOSIS — Z87.81 S/P ORIF (OPEN REDUCTION INTERNAL FIXATION) FRACTURE: ICD-10-CM

## 2021-09-07 DIAGNOSIS — M25.571 ACUTE RIGHT ANKLE PAIN: Primary | ICD-10-CM

## 2021-09-07 DIAGNOSIS — R29.898 ANKLE WEAKNESS: ICD-10-CM

## 2021-09-07 PROCEDURE — 97110 THERAPEUTIC EXERCISES: CPT | Performed by: PHYSICAL THERAPIST

## 2021-09-07 NOTE — PROGRESS NOTES
Physical Therapy Daily Progress Note        Patient: Christian Copeland   : 1970  Diagnosis/ICD-10 Code:  Acute right ankle pain [M25.571]  Referring practitioner: No ref. provider found  Date of Initial Visit: Type: THERAPY  Noted: 2021  Today's Date: 2021  Patient seen for 18 sessions             Subjective   Christian Copeland reports: overall ankle feeling pretty good, states he is still hesitant with certain activities thinking he might have increased pain.    Objective   General Fatigue with strength training.     See Exercise, Manual, and Modality Logs for complete treatment.       Assessment/Plan  Christian progressing as evident by decreased overall ankle pain, still hesitant with activities. Pt tolerated exercises well, no complaints of increased pain or discomfort. Pt would benefit from skilled PT to address Range of Motion  and Strength deficits, pain management and any concerns with ADLs.       Progress per Plan of Care           Timed:  Manual Therapy:         mins  93603;  Therapeutic Exercise:    25     mins  79837;     Neuromuscular Sachi:        mins  78466;    Therapeutic Activity:          mins  04142;     Gait Training:           mins  46872;    Aquatic Therapy:          mins  55258;       Untimed:  Electrical Stimulation:         mins  20374 ( );  Mechanical Traction:         mins  81778;       Timed Treatment:   25   mins   Total Treatment:     25   mins        Steph Christianson PTA  Physical Therapist Assistant

## 2021-09-09 ENCOUNTER — TREATMENT (OUTPATIENT)
Dept: PHYSICAL THERAPY | Facility: CLINIC | Age: 51
End: 2021-09-09

## 2021-09-09 DIAGNOSIS — Z98.890 S/P ORIF (OPEN REDUCTION INTERNAL FIXATION) FRACTURE: ICD-10-CM

## 2021-09-09 DIAGNOSIS — M25.671 DECREASED RANGE OF MOTION OF RIGHT ANKLE: ICD-10-CM

## 2021-09-09 DIAGNOSIS — Z87.81 S/P ORIF (OPEN REDUCTION INTERNAL FIXATION) FRACTURE: ICD-10-CM

## 2021-09-09 DIAGNOSIS — M25.571 ACUTE RIGHT ANKLE PAIN: Primary | ICD-10-CM

## 2021-09-09 DIAGNOSIS — R29.898 ANKLE WEAKNESS: ICD-10-CM

## 2021-09-09 PROCEDURE — 97110 THERAPEUTIC EXERCISES: CPT | Performed by: PHYSICAL THERAPIST

## 2021-09-09 NOTE — PROGRESS NOTES
Physical Therapy Daily Progress Note    Patient: Christian Copeland   : 1970  Diagnosis/ICD-10 Code:  Acute right ankle pain [M25.571]  Referring practitioner: Domingo Marcum*  Date of Initial Visit: Type: THERAPY  Noted: 2021  Today's Date: 2021  Patient seen for 19 sessions           Subjective   The patient reported that his ankle pain is a 0/10 today. He still experiences swelling if he does too much, but it has gotten better over the past month. He is still a little hesitant about going down stairs. He follows up with Collin Thompson next week and will have x-rays taken to see if his bone is healing better. He is agreeable to put PT on hold at this time pending the outcome of that visit next week.    Objective   See Exercise, Manual, and Modality Logs for complete treatment.     Assessment/Plan  The patient has demonstrated good tolerance to therapeutic exercise throughout his time in physical therapy. He has made consistent gains in ankle strength, ROM, and gait mechanics. He was able to safely ascend/descend 8 steps today with a reciprocal gait pattern. He is appropriate for discharge from PT pending the results from x-ray next week.        Timed:  Manual Therapy:    0     mins  41874;  Therapeutic Exercise:    26     mins  92162;     Neuromuscular Sachi:   0    mins  42519;    Therapeutic Activity:     0     mins  89983;     Gait Trainin     mins  75666;     Aquatics                         0      mins  21762    Un-timed:  Mechanical Traction      0     mins  86018  Dry Needling     0     mins self-pay  Electrical Stimulation:    0     mins  71149 ( );      Timed Treatment:   26   mins   Total Treatment:     26   mins    Markos Zimmerman PT  Physical Therapist

## 2021-09-13 ENCOUNTER — OFFICE VISIT (OUTPATIENT)
Dept: ORTHOPEDIC SURGERY | Facility: CLINIC | Age: 51
End: 2021-09-13

## 2021-09-13 VITALS — BODY MASS INDEX: 39.81 KG/M2 | HEIGHT: 73 IN | WEIGHT: 300.4 LBS | HEART RATE: 100 BPM | OXYGEN SATURATION: 95 %

## 2021-09-13 DIAGNOSIS — Z47.89 AFTERCARE FOLLOWING SURGERY OF THE MUSCULOSKELETAL SYSTEM: ICD-10-CM

## 2021-09-13 DIAGNOSIS — Z47.89 AFTERCARE FOLLOWING SURGERY OF THE MUSCULOSKELETAL SYSTEM: Primary | ICD-10-CM

## 2021-09-13 PROCEDURE — 99212 OFFICE O/P EST SF 10 MIN: CPT | Performed by: PHYSICIAN ASSISTANT

## 2021-09-13 NOTE — PROGRESS NOTES
"Chief Complaint  Pain and Follow-up of the Right Ankle    Subjective          Christian Copeland is a 51 y.o. male  presents to [facility] for   History of Present Illness    Patient presents for follow-up evaluation of ORIF right bimalleolar ankle fracture, 5/26/2021.  Patient states he has been doing well he states he finished physical therapy last week, he states that they stated that he met all his goals.  Patient denies pain in the ankle, states he is able to wear regular walking shoes he states when he wakes up in the morning he does not have to think about what he has to do to avoid causing injury or pain to his ankle.  Patient states he is trusting his ankle more, he states he has good strength and range of motion, denies need for pain medication or NSAIDs.  No new complaints today.  No Known Allergies     Social History     Socioeconomic History   • Marital status:      Spouse name: Not on file   • Number of children: Not on file   • Years of education: Not on file   • Highest education level: Not on file   Tobacco Use   • Smoking status: Never Smoker   • Smokeless tobacco: Never Used   Vaping Use   • Vaping Use: Never used   Substance and Sexual Activity   • Alcohol use: Yes     Comment: occasion   • Drug use: Never   • Sexual activity: Defer        REVIEW OF SYSTEMS    Constitutional: Denies fevers, chills, weight loss  Cardiovascular: Denies chest pain, shortness of breath  Skin: Denies rashes, acute skin changes  Neurologic: Denies headache, loss of consciousness  MSK: Right ankle pain      Objective   Vital Signs:   Pulse 100   Ht 185.4 cm (73\")   Wt (!) 136 kg (300 lb 6.4 oz)   SpO2 95%   BMI 39.63 kg/m²     Body mass index is 39.63 kg/m².    Physical Exam    Right ankle: Incision is well-healed, no erythema, no ecchymosis, no swelling, no effusion, no signs of infection.  Nontender medial, lateral, posterior or anterior ankle.  Dorsiflexion 10, plantarflexion " 35, ankle is stable, 2+ dorsalis pedis/posterior talus pulses, 2+ capillary refill sensation intact light touch, patient able to wiggle toes, ambulates with nonantalgic gait.    Procedures    Imaging Results (Most Recent)     Procedure Component Value Units Date/Time    XR Ankle 2 View Right [[id number]] Resulted: 09/13/21 1501     Updated: 09/13/21 1501    Narrative:      • View:AP and Lateral view(s)  • Site: Right ankle  • Indication: Right ankle pain  • Study: X-rays ordered, taken in the office, and reviewed today  • X-ray: Good healing of distal fibula fracture with intact plate and   screws, ankle mortise is intact, no signs of hardware failure or   loosening.  • Comparative data: No comparative data found             Result Review :   The following data was reviewed by: CHESTER Jewell on 09/13/2021:  Data reviewed: Radiologic studies Reviewed by me with the patient             Assessment and Plan    Diagnoses and all orders for this visit:    1. Aftercare following surgery of ORIF right bimalleolar ankle fracture, 5/26/2021 (Primary)  -     XR Ankle 2 View Right    2. Aftercare following surgery of the musculoskeletal system  -     XR Ankle 2 View Right        Reviewed x-rays with patient, advised him he may continue activity as tolerated, weightbearing as tolerated, if any new or concerning symptoms occur follow-up sooner otherwise patient was advised to follow-up as needed and agreed to this plan.    Call or return if worsening symptoms.    Follow Up   Return if symptoms worsen or fail to improve.  Patient was given instructions and counseling regarding his condition or for health maintenance advice. Please see specific information pulled into the AVS if appropriate.

## 2021-12-03 ENCOUNTER — DOCUMENTATION (OUTPATIENT)
Dept: PHYSICAL THERAPY | Facility: CLINIC | Age: 51
End: 2021-12-03

## 2022-01-11 ENCOUNTER — OFFICE VISIT (OUTPATIENT)
Dept: OTOLARYNGOLOGY | Facility: CLINIC | Age: 52
End: 2022-01-11

## 2022-01-11 ENCOUNTER — PROCEDURE VISIT (OUTPATIENT)
Dept: OTOLARYNGOLOGY | Facility: CLINIC | Age: 52
End: 2022-01-11

## 2022-01-11 VITALS — BODY MASS INDEX: 41.4 KG/M2 | TEMPERATURE: 97.2 F | WEIGHT: 312.4 LBS | HEIGHT: 73 IN

## 2022-01-11 DIAGNOSIS — H90.3 SENSORINEURAL HEARING LOSS (SNHL) OF BOTH EARS: ICD-10-CM

## 2022-01-11 DIAGNOSIS — H93.13 TINNITUS OF BOTH EARS: ICD-10-CM

## 2022-01-11 DIAGNOSIS — H90.3 SENSORY HEARING LOSS, BILATERAL: ICD-10-CM

## 2022-01-11 DIAGNOSIS — R42 VERTIGO: Primary | ICD-10-CM

## 2022-01-11 PROCEDURE — 92567 TYMPANOMETRY: CPT | Performed by: AUDIOLOGIST

## 2022-01-11 PROCEDURE — 92557 COMPREHENSIVE HEARING TEST: CPT | Performed by: AUDIOLOGIST

## 2022-01-11 PROCEDURE — 99204 OFFICE O/P NEW MOD 45 MIN: CPT | Performed by: OTOLARYNGOLOGY

## 2022-01-12 PROBLEM — H90.3 SENSORINEURAL HEARING LOSS (SNHL) OF BOTH EARS: Status: ACTIVE | Noted: 2022-01-12

## 2022-01-12 PROBLEM — H93.13 TINNITUS OF BOTH EARS: Status: ACTIVE | Noted: 2022-01-12

## 2022-01-12 PROBLEM — R42 VERTIGO: Status: ACTIVE | Noted: 2022-01-12

## 2022-01-12 NOTE — PROGRESS NOTES
Patient Name: Christian Copeland   Visit Date: 01/11/2022   Patient ID: [id number]  Provider: Daniel Nice MD    Sex: male  Location: [facility]   YOB: 1970  Location Address: [address] [place] [address], [address] [place] [address],?[place]?[id number]    Primary Care Provider sOmany Kaminski MD  Location Phone: [phone]    Referring Provider: Osmany Kaminski MD        Chief Complaint  Tinnitus (New patient )    Subjective    History of Present Illness  Christian Copeland is a 51 y.o. male who presents to [facility] today as a consult from Osmany Kaminski MD.    He presents the clinic today for evaluation of ear ringing, hearing loss, and some issues with vertigo. He notes that the vertigo started initially 6 months ago, and he has had several episodes.  Describes true spinning sensation which lasts minutes to hours.  Has not sustained any falls.  Does have ear ringing which she describes as high-pitched, louder on the left side.  Denies any significant issues with ear fullness or pressure symptoms.  Denies any history of ear disease.  Has not had any ear drainage.  Notes that for the most part his hearing is pretty decent, but he has not had his hearing tested in quite some time.  His vertigo issues resolved and then symptoms returned about a month ago.  He is currently not have any difficulty with vertigo, but had a significant bout 1 month ago accompanied by nausea and vomiting.  He has taken meclizine for this.    His primary care physician obtained a CT scan of the head and brain which revealed no intracranial abnormalities or masses, normal-appearing temporal bones without any significant findings.  I discussed the results with the patient today.    Past Medical History:   Diagnosis Date   • Depression    • HL (hearing loss)    • Seasonal allergies    • Sleep disorder    • Tinnitus    • Vertigo        Past Surgical History:   Procedure Laterality Date   • ANAL  "FISSURECTOMY     • ANKLE SURGERY           Current Outpatient Medications:   •  acetaminophen (TYLENOL) 325 MG tablet, Take 650 mg by mouth., Disp: , Rfl:   •  benzonatate (TESSALON) 100 MG capsule, Take 200 mg by mouth 3 (Three) Times a Day As Needed., Disp: , Rfl:   •  escitalopram (LEXAPRO) 5 MG tablet, Take 15 mg by mouth Every Morning., Disp: , Rfl:   •  meclizine (ANTIVERT) 25 MG tablet, Take 1 tablet by mouth 4 (Four) Times a Day As Needed for Dizziness., Disp: 60 tablet, Rfl: 0  •  montelukast (SINGULAIR) 10 MG tablet, Take 10 mg by mouth Daily., Disp: , Rfl:   •  ondansetron ODT (ZOFRAN-ODT) 4 MG disintegrating tablet, Place 1 tablet on the tongue 4 (Four) Times a Day As Needed for Nausea., Disp: 20 tablet, Rfl: 0  •  temazepam (RESTORIL) 30 MG capsule, Take 30 mg by mouth., Disp: , Rfl:   •  predniSONE (DELTASONE) 20 MG tablet, Take 1 tablet by mouth Daily., Disp: 5 tablet, Rfl: 0     No Known Allergies    Family History   Problem Relation Age of Onset   • Heart disease Mother    • Cancer Father    • Diabetes Paternal Grandmother         Social History     Social History Narrative   • Not on file       Objective     Vital Signs:   Temp 97.2 °F (36.2 °C) (Temporal)   Ht 185.4 cm (73\")   Wt (!) 142 kg (312 lb 6.4 oz)   BMI 41.22 kg/m²       Physical Exam         Constitutional   Appearance  · : well developed, well-nourished, alert and in no acute distress, voice clear and strong    Head  Inspection  · : no deformities or lesions  Face  Inspection  · : No facial lesions; House-Brackmann I/VI bilaterally  Palpation  · : No TMJ crepitus nor  muscle tenderness bilaterally    Eyes  Vision  Visual Fields  · : Extraocular movements are intact. No spontaneous or gaze-induced nystagmus.  Conjunctivae  · : clear  Sclerae  · : clear  Pupils and Irises  · : pupils equal, round, and reactive to light.     Ears, Nose, Mouth and Throat    Ears    External Ears  · : appearance within normal limits, no lesions " present  Otoscopic Examination  · : Tympanic membrane appearance within normal limits bilaterally without perforations, well-aerated middle ears  Hearing  · : intact to conversational voice both ears  Tunning fork testing:     :    Nose    External Nose  · : appearance normal  Intranasal Exam  · : mucosa within normal limits, vestibules normal, no intranasal lesions present, septum midline, sinuses non tender to percussion  Oral Cavity    Oral Mucosa  · : oral mucosa normal without pallor or cyanosis  Lips  · : lip appearance normal  Teeth  · : normal dentition for age  Gums  · : gums pink, non-swollen, no bleeding present  Tongue  · : tongue appearance normal; normal mobility  Palate  · : hard palate normal, soft palate appearance normal with symmetric mobility    Throat    Oropharynx  · : no inflammation or lesions present, tonsils within normal limits  Hypopharynx  · : appearance within normal limits, superior epiglottis within normal limits  Larynx  · : appearance within normal limits, vocal cords within normal limits, no lesions present    Neck  Inspection/Palpation  · : normal appearance, no masses or tenderness, trachea midline; thyroid size normal, nontender, no nodules or masses present on palpation    Respiratory  Respiratory Effort  · : breathing unlabored  Inspection of Chest  · : normal appearance, no retractions    Cardiovascular  Heart  · : regular rate and rhythm    Lymphatic  Neck  · : no lymphadenopathy present  Supraclavicular Nodes  · : no lymphadenopathy present  Preauricular Nodes  · : no lymphadenopathy present    Skin and Subcutaneous Tissue  General Inspection  · : Regarding face and neck - there are no rashes present, no lesions present, and no areas of discoloration    Neurologic  Cranial Nerves  · : cranial nerves II-XII are grossly intact bilaterally  Gait and Station  · : normal gait, normal tandem gait, negative Romberg, negative Lexy-Hallpike maneuver, negative Fukuda marching test,  able to stand without diffculty    Psychiatric  Judgement and Insight  · : judgment and insight intact  Mood and Affect  · : mood normal, affect appropriate          Assessment and Plan    Diagnoses and all orders for this visit:    1. Vertigo (Primary)  -     Tympanometry; Future  -     Comprehensive Hearing Test; Future    2. Tinnitus of both ears  -     Tympanometry; Future  -     Comprehensive Hearing Test; Future    3. Sensorineural hearing loss (SNHL) of both ears  -     Tympanometry; Future  -     Comprehensive Hearing Test; Future    Examination today revealed a normal ear exam.  Balance testing did not reveal any significant vestibular dysfunction.  I did obtain an audiogram today which revealed normal hearing with mild sensorineural hearing loss slightly worse in the left ear and noise notch type pattern.  The patient did reveal to me significant history of noise exposure.  He does use hearing protection, but not all the time.  Tympanograms were normal on both sides and were discrimination scores were 88% bilaterally.  I believe that based on his description, he may have had several episodes of vestibular neuronitis.  It is also possible that he has Ménière's disease.  I have recommended that he maintain a low-salt diet and avoid excessive caffeine intake.  I have asked that he contact me if his symptoms are not improved or if he continues to have issues with vertigo.    Follow Up   No follow-ups on file.  Patient was given instructions and counseling regarding his condition or for health maintenance advice. Please see specific information pulled into the AVS if appropriate.

## 2022-09-09 ENCOUNTER — OFFICE VISIT (OUTPATIENT)
Dept: GASTROENTEROLOGY | Facility: CLINIC | Age: 52
End: 2022-09-09

## 2022-09-09 ENCOUNTER — PREP FOR SURGERY (OUTPATIENT)
Dept: OTHER | Facility: HOSPITAL | Age: 52
End: 2022-09-09

## 2022-09-09 VITALS
DIASTOLIC BLOOD PRESSURE: 94 MMHG | WEIGHT: 313.6 LBS | HEART RATE: 78 BPM | SYSTOLIC BLOOD PRESSURE: 134 MMHG | HEIGHT: 73 IN | BODY MASS INDEX: 41.56 KG/M2

## 2022-09-09 DIAGNOSIS — Z80.0 FAMILY HISTORY OF COLON CANCER: Primary | ICD-10-CM

## 2022-09-09 DIAGNOSIS — Z86.010 HISTORY OF COLON POLYPS: ICD-10-CM

## 2022-09-09 PROBLEM — Z86.0100 HISTORY OF COLON POLYPS: Status: ACTIVE | Noted: 2022-09-09

## 2022-09-09 PROCEDURE — 99213 OFFICE O/P EST LOW 20 MIN: CPT | Performed by: NURSE PRACTITIONER

## 2022-09-09 RX ORDER — POLYETHYLENE GLYCOL 3350, SODIUM SULFATE ANHYDROUS, SODIUM BICARBONATE, SODIUM CHLORIDE, POTASSIUM CHLORIDE 227.1; 21.5; 6.36; 5.53; .754 G/L; G/L; G/L; G/L; G/L
4 POWDER, FOR SOLUTION ORAL DAILY
Qty: 1 EACH | Refills: 0 | Status: SHIPPED | OUTPATIENT
Start: 2022-09-09 | End: 2022-09-10

## 2022-09-09 NOTE — PROGRESS NOTES
"Patient Name: Christian Copeland   Visit Date: 09/09/2022   Patient ID: [id number]  Provider: LUIS ENRIQUE Rivero    Sex: male  Location:  Location Address:  Location Phone: [address]  [place] [address] [phone]    YOB: 1970  Age: 52 y.o.   Primary Care Provider Osmany Kaminski MD      Referring Provider: Osmany Kaminski MD        Chief Complaint  Colon Cancer Screening (Pt would rather have colon screening sooner than next year )    History of Present Illness  Pt is feeling well no abd pain, no diarrhea, no blood in stool.  Some burping but no HB  Patient request to have colonoscopy repeated now due to his family history and was not comfortable waiting 5 years    Last colonoscopy 2018 adequate prep, 5 mm adenomatous polyp in the ascending colon completely removed, internal hemorrhoids  Fam hx colon cancer - dad dx at 48-49, PGM Breast cancer 60-70's    No cardiopulmonary hx  Past Medical History:   Diagnosis Date   • Depression    • HL (hearing loss)    • Seasonal allergies    • Sleep disorder    • Tinnitus    • Vertigo        Past Surgical History:   Procedure Laterality Date   • ANAL FISSURECTOMY     • ANKLE SURGERY     • COLONOSCOPY  06/26/2018       No Known Allergies    Family History   Problem Relation Age of Onset   • Heart disease Mother    • Colon cancer Father 50   • Cancer Father    • Diabetes Paternal Grandmother         Social History     Tobacco Use   • Smoking status: Never Smoker   • Smokeless tobacco: Never Used   Vaping Use   • Vaping Use: Never used   Substance Use Topics   • Alcohol use: Not Currently     Comment: occasion   • Drug use: Never       Objective     Vital Signs:   /94 (BP Location: Left arm, Patient Position: Sitting, Cuff Size: Adult)   Pulse 78   Ht 185.4 cm (73\")   Wt (!) 142 kg (313 lb 9.6 oz)   BMI 41.37 kg/m²       Physical Exam  Constitutional:       General: The patient is not in acute distress.     Appearance: Normal appearance.   HENT: "      Head: Normocephalic and atraumatic.      Nose: Nose normal.   Pulmonary:      Effort: Pulmonary effort is normal. No respiratory distress.   Abdominal:      General: Abdomen is flat.      Palpations: Abdomen is soft. There is no mass.      Tenderness: There is no abdominal tenderness. There is no guarding.   Musculoskeletal:      Cervical back: Neck supple.      Right lower leg: No edema.      Left lower leg: No edema.   Skin:     General: Skin is warm and dry.   Neurological:      General: No focal deficit present.      Mental Status: The patient is alert and oriented to person, place, and time.      Gait: Gait normal.   Psychiatric:         Mood and Affect: Mood normal.         Speech: Speech normal.         Behavior: Behavior normal.         Thought Content: Thought content normal.     Result Review :   The following data was reviewed by: LUIS ENRIQUE Rivero on 09/09/2022:                        Assessment and Plan    Diagnoses and all orders for this visit:    1. Family history of colon cancer (Primary)    2. History of colon polyps    Other orders  -     PEG 3350-KCl-NaBcb-NaCl-NaSulf (Golytely) 227.1 g pack; Take 4 L by mouth Daily for 1 day. Take per office instructions  Dispense: 1 each; Refill: 0            Follow Up   Return if symptoms worsen or fail to improve.   Colonoscopy Surgical Risk and Benefits: Possible risks/complications, benefits, and alternatives to surgical or invasive procedure have been explained to patient and/or legal guardian; risks include bleeding, infection, and perforation. Patient has been evaluated and can tolerate anesthesia and/or sedation. Risks, benefits, and alternatives to anesthesia and sedation have been explained to patient and/or legal guardian.   Also offered patient genetic testing, he would like to think about this    Patient was given instructions and counseling regarding his condition or for health maintenance advice. Please see specific information  pulled into the AVS if appropriate.

## 2022-12-09 NOTE — PRE-PROCEDURE INSTRUCTIONS
Education provided on laxative administration, as well as diet restrictions prior to day of procedure.    Medications were assessed and reviewed with instructions given on which medications are okay to take prior to procedure.      Advised Tylenol is okay, however do not take any NSAIDS.  All vitamins and supplements needs to be held until after the procedure.

## 2022-12-12 ENCOUNTER — HOSPITAL ENCOUNTER (OUTPATIENT)
Facility: HOSPITAL | Age: 52
Setting detail: HOSPITAL OUTPATIENT SURGERY
Discharge: HOME | End: 2022-12-12
Attending: INTERNAL MEDICINE | Admitting: INTERNAL MEDICINE

## 2022-12-12 ENCOUNTER — ANESTHESIA (OUTPATIENT)
Dept: GASTROENTEROLOGY | Facility: HOSPITAL | Age: 52
End: 2022-12-12

## 2022-12-12 ENCOUNTER — ANESTHESIA EVENT (OUTPATIENT)
Dept: GASTROENTEROLOGY | Facility: HOSPITAL | Age: 52
End: 2022-12-12

## 2022-12-12 VITALS
HEIGHT: 72 IN | RESPIRATION RATE: 15 BRPM | DIASTOLIC BLOOD PRESSURE: 77 MMHG | WEIGHT: 301.59 LBS | OXYGEN SATURATION: 97 % | TEMPERATURE: 98.7 F | SYSTOLIC BLOOD PRESSURE: 105 MMHG | BODY MASS INDEX: 40.85 KG/M2 | HEART RATE: 75 BPM

## 2022-12-12 PROCEDURE — 45378 DIAGNOSTIC COLONOSCOPY: CPT | Performed by: INTERNAL MEDICINE

## 2022-12-12 PROCEDURE — 25010000002 PROPOFOL 10 MG/ML EMULSION: Performed by: NURSE ANESTHETIST, CERTIFIED REGISTERED

## 2022-12-12 RX ORDER — PROPOFOL 10 MG/ML
VIAL (ML) INTRAVENOUS AS NEEDED
Status: DISCONTINUED | OUTPATIENT
Start: 2022-12-12 | End: 2022-12-12 | Stop reason: SURG

## 2022-12-12 RX ORDER — SODIUM CHLORIDE, SODIUM LACTATE, POTASSIUM CHLORIDE, CALCIUM CHLORIDE 600; 310; 30; 20 MG/100ML; MG/100ML; MG/100ML; MG/100ML
30 INJECTION, SOLUTION INTRAVENOUS CONTINUOUS
Status: DISCONTINUED | OUTPATIENT
Start: 2022-12-12 | End: 2022-12-12 | Stop reason: HOSPADM

## 2022-12-12 RX ORDER — LIDOCAINE HYDROCHLORIDE 20 MG/ML
INJECTION, SOLUTION EPIDURAL; INFILTRATION; INTRACAUDAL; PERINEURAL AS NEEDED
Status: DISCONTINUED | OUTPATIENT
Start: 2022-12-12 | End: 2022-12-12 | Stop reason: SURG

## 2022-12-12 RX ADMIN — PROPOFOL 175 MCG/KG/MIN: 10 INJECTION, EMULSION INTRAVENOUS at 08:31

## 2022-12-12 RX ADMIN — LIDOCAINE HYDROCHLORIDE 50 MG: 20 INJECTION, SOLUTION EPIDURAL; INFILTRATION; INTRACAUDAL; PERINEURAL at 08:31

## 2022-12-12 RX ADMIN — PROPOFOL 100 MG: 10 INJECTION, EMULSION INTRAVENOUS at 08:31

## 2022-12-12 RX ADMIN — SODIUM CHLORIDE, POTASSIUM CHLORIDE, SODIUM LACTATE AND CALCIUM CHLORIDE: 600; 310; 30; 20 INJECTION, SOLUTION INTRAVENOUS at 08:28

## 2022-12-12 NOTE — H&P
"Pre Procedure History & Physical    Chief Complaint:   Past history colon polyps and family history colon cancer    Subjective     HPI:   Past history of colon polyps patient's father had colon cancer at 50    Past Medical History:   Past Medical History:   Diagnosis Date   • Depression    • HL (hearing loss)    • Seasonal allergies    • Sleep disorder    • Tinnitus    • Vertigo        Past Surgical History:  Past Surgical History:   Procedure Laterality Date   • ANAL FISSURECTOMY     • ANKLE SURGERY     • COLONOSCOPY  06/26/2018       Family History:  Family History   Problem Relation Age of Onset   • Heart disease Mother    • Colon cancer Father 50   • Cancer Father    • Diabetes Paternal Grandmother    • Malig Hyperthermia Neg Hx        Social History:   reports that he has never smoked. He has never used smokeless tobacco. He reports that he does not currently use alcohol. He reports that he does not use drugs.    Medications:   Medications Prior to Admission   Medication Sig Dispense Refill Last Dose   • cetirizine (zyrTEC) 10 MG tablet Take 10 mg by mouth Daily.   Past Week   • escitalopram (LEXAPRO) 5 MG tablet Take 5 mg by mouth Every Morning.   Past Week   • temazepam (RESTORIL) 30 MG capsule Take 30 mg by mouth At Night As Needed.   More than a month       Allergies:  Patient has no known allergies.        Objective     Blood pressure 113/78, pulse 65, temperature 97.4 °F (36.3 °C), temperature source Temporal, resp. rate 18, height 182.9 cm (72\"), weight (!) 137 kg (301 lb 9.4 oz), SpO2 94 %.    Physical Exam   Constitutional: Pt is oriented to person, place, and time and well-developed, well-nourished, and in no distress.   Mouth/Throat: Oropharynx is clear and moist.   Neck: Normal range of motion.   Cardiovascular: Normal rate, regular rhythm and normal heart sounds.    Pulmonary/Chest: Effort normal and breath sounds normal.   Abdominal: Soft. Nontender  Skin: Skin is warm and dry.   Psychiatric: " Mood, memory, affect and judgment normal.     Assessment & Plan     Diagnosis:  High risk surveillance and screening    Anticipated Surgical Procedure:  Colonoscopy    The risks, benefits, and alternatives of this procedure have been discussed with the patient or the responsible party- the patient understands and agrees to proceed.

## 2022-12-12 NOTE — ANESTHESIA POSTPROCEDURE EVALUATION
Patient: Christian Copeland    Procedure Summary     Date: 12/12/22 Room / Location: [facility] ENDOSCOPY 4 / [facility]COPY    Anesthesia Start: 0828 Anesthesia Stop: 0915    Procedure: COLONOSCOPY FOR SCREENING Diagnosis:       Family history of colon cancer      History of colon polyps      (Family history of colon cancer [Z80.0])      (History of colon polyps [Z86.010])    Surgeons: Moshe Medrano MD Provider: Reyes, Mirabelle, DO    Anesthesia Type: general ASA Status: 2          Anesthesia Type: general    Vitals  Vitals Value Taken Time   /77 12/12/22 0930   Temp 37.1 °C (98.7 °F) 12/12/22 0930   Pulse 74 12/12/22 0932   Resp 15 12/12/22 0930   SpO2 96 % 12/12/22 0932   Vitals shown include unvalidated device data.        Post Anesthesia Care and Evaluation    Patient location during evaluation: bedside  Patient participation: complete - patient participated  Level of consciousness: awake  Pain management: adequate    Airway patency: patent  Anesthetic complications: No anesthetic complications  PONV Status: none  Cardiovascular status: acceptable and stable  Respiratory status: acceptable  Hydration status: acceptable    Comments: An Anesthesiologist personally participated in the most demanding procedures (including induction and emergence if applicable) in the anesthesia plan, monitored the course of anesthesia administration at frequent intervals and remained physically present and available for immediate diagnosis and treatment of emergencies.

## 2022-12-12 NOTE — ANESTHESIA PREPROCEDURE EVALUATION
Anesthesia Evaluation     Patient summary reviewed and Nursing notes reviewed   no history of anesthetic complications:  NPO Solid Status: > 8 hours  NPO Liquid Status: > 2 hours           Airway   Mallampati: II  TM distance: >3 FB  Neck ROM: full  No difficulty expected  Dental - normal exam     Pulmonary - normal exam    breath sounds clear to auscultation  (+) sleep apnea (possible-will have test next month),   Cardiovascular - negative cardio ROS and normal exam  Exercise tolerance: good (4-7 METS)    Rhythm: regular  Rate: normal        Neuro/Psych  (+) dizziness/light headedness, psychiatric history Anxiety and Depression,    GI/Hepatic/Renal/Endo    (+) morbid obesity,      ROS Comment: Family hx colon ca    Musculoskeletal (-) negative ROS    Abdominal    Substance History - negative use     OB/GYN negative ob/gyn ROS         Other - negative ROS       ROS/Med Hx Other: PAT Nursing Notes unavailable.                   Anesthesia Plan    ASA 2     general     (Patient understands anesthesia not responsible for dental damage.)  intravenous induction     Anesthetic plan, risks, benefits, and alternatives have been provided, discussed and informed consent has been obtained with: patient.  Pre-procedure education provided  Use of blood products discussed with patient .   Plan discussed with CRNA.        CODE STATUS:

## 2022-12-14 ENCOUNTER — TELEPHONE (OUTPATIENT)
Dept: GASTROENTEROLOGY | Facility: CLINIC | Age: 52
End: 2022-12-14

## 2022-12-14 NOTE — TELEPHONE ENCOUNTER
Colonoscopy 12/12/2022: Good prep, negative, repeat in 2 years for surveillance due to family history

## 2024-11-14 ENCOUNTER — HOSPITAL ENCOUNTER (EMERGENCY)
Facility: HOSPITAL | Age: 54
Discharge: HOME | End: 2024-11-14
Attending: EMERGENCY MEDICINE
Payer: OTHER GOVERNMENT

## 2024-11-14 ENCOUNTER — APPOINTMENT (OUTPATIENT)
Dept: GENERAL RADIOLOGY | Facility: HOSPITAL | Age: 54
End: 2024-11-14
Payer: OTHER GOVERNMENT

## 2024-11-14 VITALS
TEMPERATURE: 98 F | WEIGHT: 301.59 LBS | RESPIRATION RATE: 18 BRPM | HEART RATE: 74 BPM | OXYGEN SATURATION: 96 % | BODY MASS INDEX: 39.97 KG/M2 | HEIGHT: 73 IN | DIASTOLIC BLOOD PRESSURE: 82 MMHG | SYSTOLIC BLOOD PRESSURE: 126 MMHG

## 2024-11-14 DIAGNOSIS — M94.0 COSTOCHONDRITIS, ACUTE: Primary | ICD-10-CM

## 2024-11-14 LAB
ALBUMIN SERPL-MCNC: 4.3 G/DL (ref 3.5–5.2)
ALBUMIN/GLOB SERPL: 1.5 G/DL
ALP SERPL-CCNC: 113 U/L (ref 39–117)
ALT SERPL W P-5'-P-CCNC: 24 U/L (ref 1–41)
ANION GAP SERPL CALCULATED.3IONS-SCNC: 12.1 MMOL/L (ref 5–15)
AST SERPL-CCNC: 19 U/L (ref 1–40)
BASOPHILS # BLD AUTO: 0.08 10*3/MM3 (ref 0–0.2)
BASOPHILS NFR BLD AUTO: 0.9 % (ref 0–1.5)
BILIRUB SERPL-MCNC: 0.5 MG/DL (ref 0–1.2)
BUN SERPL-MCNC: 18 MG/DL (ref 6–20)
BUN/CREAT SERPL: 18.4 (ref 7–25)
CALCIUM SPEC-SCNC: 9.3 MG/DL (ref 8.6–10.5)
CHLORIDE SERPL-SCNC: 102 MMOL/L (ref 98–107)
CO2 SERPL-SCNC: 22.9 MMOL/L (ref 22–29)
CREAT SERPL-MCNC: 0.98 MG/DL (ref 0.76–1.27)
DEPRECATED RDW RBC AUTO: 43.3 FL (ref 37–54)
EGFRCR SERPLBLD CKD-EPI 2021: 91.1 ML/MIN/1.73
EOSINOPHIL # BLD AUTO: 0.28 10*3/MM3 (ref 0–0.4)
EOSINOPHIL NFR BLD AUTO: 3.2 % (ref 0.3–6.2)
ERYTHROCYTE [DISTWIDTH] IN BLOOD BY AUTOMATED COUNT: 13.3 % (ref 12.3–15.4)
GLOBULIN UR ELPH-MCNC: 2.8 GM/DL
GLUCOSE SERPL-MCNC: 127 MG/DL (ref 65–99)
HCT VFR BLD AUTO: 46.5 % (ref 37.5–51)
HGB BLD-MCNC: 15.4 G/DL (ref 13–17.7)
HOLD SPECIMEN: NORMAL
HOLD SPECIMEN: NORMAL
IMM GRANULOCYTES # BLD AUTO: 0.02 10*3/MM3 (ref 0–0.05)
IMM GRANULOCYTES NFR BLD AUTO: 0.2 % (ref 0–0.5)
LYMPHOCYTES # BLD AUTO: 1.91 10*3/MM3 (ref 0.7–3.1)
LYMPHOCYTES NFR BLD AUTO: 22.1 % (ref 19.6–45.3)
MCH RBC QN AUTO: 29.5 PG (ref 26.6–33)
MCHC RBC AUTO-ENTMCNC: 33.1 G/DL (ref 31.5–35.7)
MCV RBC AUTO: 89.1 FL (ref 79–97)
MONOCYTES # BLD AUTO: 0.57 10*3/MM3 (ref 0.1–0.9)
MONOCYTES NFR BLD AUTO: 6.6 % (ref 5–12)
NEUTROPHILS NFR BLD AUTO: 5.78 10*3/MM3 (ref 1.7–7)
NEUTROPHILS NFR BLD AUTO: 67 % (ref 42.7–76)
NRBC BLD AUTO-RTO: 0 /100 WBC (ref 0–0.2)
NT-PROBNP SERPL-MCNC: <36 PG/ML (ref 0–900)
PLATELET # BLD AUTO: 316 10*3/MM3 (ref 140–450)
PMV BLD AUTO: 9.3 FL (ref 6–12)
POTASSIUM SERPL-SCNC: 3.7 MMOL/L (ref 3.5–5.2)
PROT SERPL-MCNC: 7.1 G/DL (ref 6–8.5)
QT INTERVAL: 364 MS
QTC INTERVAL: 414 MS
RBC # BLD AUTO: 5.22 10*6/MM3 (ref 4.14–5.8)
SODIUM SERPL-SCNC: 137 MMOL/L (ref 136–145)
TROPONIN T SERPL HS-MCNC: <6 NG/L
WBC NRBC COR # BLD AUTO: 8.64 10*3/MM3 (ref 3.4–10.8)
WHOLE BLOOD HOLD COAG: NORMAL
WHOLE BLOOD HOLD SPECIMEN: NORMAL

## 2024-11-14 PROCEDURE — 93005 ELECTROCARDIOGRAM TRACING: CPT | Performed by: EMERGENCY MEDICINE

## 2024-11-14 PROCEDURE — 80053 COMPREHEN METABOLIC PANEL: CPT | Performed by: EMERGENCY MEDICINE

## 2024-11-14 PROCEDURE — 71045 X-RAY EXAM CHEST 1 VIEW: CPT

## 2024-11-14 PROCEDURE — 93005 ELECTROCARDIOGRAM TRACING: CPT

## 2024-11-14 PROCEDURE — 84484 ASSAY OF TROPONIN QUANT: CPT | Performed by: EMERGENCY MEDICINE

## 2024-11-14 PROCEDURE — 83880 ASSAY OF NATRIURETIC PEPTIDE: CPT | Performed by: EMERGENCY MEDICINE

## 2024-11-14 PROCEDURE — 85025 COMPLETE CBC W/AUTO DIFF WBC: CPT

## 2024-11-14 PROCEDURE — 99284 EMERGENCY DEPT VISIT MOD MDM: CPT

## 2024-11-14 RX ORDER — LIDOCAINE 4 G/G
1 PATCH TOPICAL EVERY 24 HOURS
Qty: 15 EACH | Refills: 0 | Status: SHIPPED | OUTPATIENT
Start: 2024-11-14

## 2024-11-14 RX ORDER — SODIUM CHLORIDE 0.9 % (FLUSH) 0.9 %
10 SYRINGE (ML) INJECTION AS NEEDED
Status: DISCONTINUED | OUTPATIENT
Start: 2024-11-14 | End: 2024-11-14 | Stop reason: HOSPADM

## 2024-11-15 NOTE — DISCHARGE INSTRUCTIONS
Your lab work, EKG, x-ray, and exam do not suggest any acute cardiac events at this time.  Your exam and symptoms are consistent with costochondritis.  Take Tylenol or Motrin for pain.  You are being discharged home with lidocaine patches.    Follow-up with your PCP in 3 to 5 days especially if symptoms worsen.    Return to the Emergency Department if you develop any uncontrollable fever, intractable pain, nausea, vomiting.

## 2024-11-15 NOTE — ED PROVIDER NOTES
Time: 8:42 PM EST  Date of encounter:  11/14/2024  Independent Historian/Clinical History and Information was obtained by:   Patient    History is limited by: N/A    Chief Complaint: Shortness of breath, chest pain      History of Present Illness:  Patient is a 54 y.o. year old male who presents to the emergency department for evaluation of shortness of breath, chest pain.  States that pain began on Friday and has gotten worse.  He did report doing more physical activity and lifting heavy objects at home.  After taking out the trash today, he started developing severe left-sided chest pain that it was difficult to breathe.  Denies any history of stroke or MI.      Patient Care Team  Primary Care Provider: Osmany Kaminski MD    Past Medical History:     No Known Allergies  Past Medical History:   Diagnosis Date    Depression     HL (hearing loss)     Seasonal allergies     Sleep disorder     Tinnitus     Vertigo      Past Surgical History:   Procedure Laterality Date    ANAL FISSURECTOMY      ANKLE SURGERY      COLONOSCOPY  06/26/2018    COLONOSCOPY N/A 12/12/2022    Procedure: COLONOSCOPY FOR SCREENING;  Surgeon: Moshe Medrano MD;  Location: formerly [facility] ENDOSCOPY;  Service: Gastroenterology;  Laterality: N/A;  NORMAL COLONOSCOPY     Family History   Problem Relation Age of Onset    Heart disease Mother     Colon cancer Father 50    Cancer Father     Diabetes Paternal Grandmother     Malig Hyperthermia Neg Hx        Home Medications:  Prior to Admission medications    Medication Sig Start Date End Date Taking? Authorizing Provider   cetirizine (zyrTEC) 10 MG tablet Take 1 tablet by mouth Daily.    Emergency, Nurse Ivan, RN   cholecalciferol 25 MCG tablet  10/23/23   Harmeet Villa MD   escitalopram (LEXAPRO) 5 MG tablet Take 1 tablet by mouth Every Morning. 6/8/21   ProviderHarmeet MD   fluticasone (FLONASE) 50 MCG/ACT nasal spray 2 sprays into the nostril(s) as directed by provider Daily.    Provider,  "MD Harmeet   temazepam (RESTORIL) 30 MG capsule Take 1 capsule by mouth At Night As Needed.    Provider, MD Harmeet        Social History:   Social History     Tobacco Use    Smoking status: Never    Smokeless tobacco: Never   Vaping Use    Vaping status: Never Used   Substance Use Topics    Alcohol use: Not Currently     Comment: occasion    Drug use: Never         Review of Systems:  Review of Systems   Constitutional:  Negative for chills and fever.   HENT:  Negative for congestion and ear pain.    Eyes:  Negative for pain.   Respiratory:  Positive for shortness of breath. Negative for cough.    Cardiovascular:  Positive for chest pain.   Gastrointestinal:  Negative for abdominal pain, diarrhea, nausea and vomiting.   Genitourinary:  Negative for dysuria and hematuria.   Musculoskeletal:  Negative for myalgias.   Skin:  Negative for rash.   Neurological:  Negative for dizziness and headaches.        Physical Exam:  /82 (BP Location: Right arm, Patient Position: Sitting)   Pulse 74   Temp 98 °F (36.7 °C) (Oral)   Resp 18   Ht 185.4 cm (73\")   Wt (!) 137 kg (301 lb 9.4 oz)   SpO2 96%   BMI 39.79 kg/m²     Physical Exam  Vitals and nursing note reviewed.   Constitutional:       Appearance: Normal appearance. He is normal weight.   HENT:      Head: Normocephalic and atraumatic.      Nose: Nose normal.   Eyes:      Conjunctiva/sclera: Conjunctivae normal.      Pupils: Pupils are equal, round, and reactive to light.   Cardiovascular:      Rate and Rhythm: Normal rate and regular rhythm.   Pulmonary:      Effort: Pulmonary effort is normal.      Breath sounds: Normal breath sounds.   Chest:       Abdominal:      General: Abdomen is flat. Bowel sounds are normal.      Palpations: Abdomen is soft.   Musculoskeletal:         General: Normal range of motion.      Cervical back: Normal range of motion and neck supple.   Skin:     General: Skin is warm and dry.   Neurological:      General: No focal " deficit present.      Mental Status: He is alert and oriented to person, place, and time.   Psychiatric:         Mood and Affect: Mood normal.         Behavior: Behavior normal.         Thought Content: Thought content normal.         Judgment: Judgment normal.                 Procedures:  Procedures      Medical Decision Making:      Comorbidities that affect care:    None    External Notes reviewed:    None      The following orders were placed and all results were independently analyzed by me:  Orders Placed This Encounter   Procedures    XR Chest 1 View    [place] Draw    Comprehensive Metabolic Panel    BNP    Single High Sensitivity Troponin T    CBC Auto Differential    NPO Diet NPO Type: Strict NPO    Undress & Gown    Continuous Pulse Oximetry    Vital Signs    Oxygen Therapy- Nasal Cannula; Titrate 1-6 LPM Per SpO2; 90 - 95%    ECG 12 Lead ED Triage Standing Order; SOA    Insert Peripheral IV    CBC & Differential    Green Top (Gel)    Lavender Top    Gold Top - SST    Light Blue Top       Medications Given in the Emergency Department:  Medications   sodium chloride 0.9 % flush 10 mL (has no administration in time range)        ED Course:    ED Course as of 11/14/24 2045   Thu Nov 14, 2024 1937 XR Chest 1 View  Negative [MV]      ED Course User Index  [MV] Martin Grant PA       Labs:    Lab Results (last 24 hours)       Procedure Component Value Units Date/Time    CBC & Differential [[id number]]  (Normal) Collected: 11/14/24 1859    Specimen: Blood Updated: 11/14/24 1918    Narrative:      The following orders were created for panel order CBC & Differential.  Procedure                               Abnormality         Status                     ---------                               -----------         ------                     CBC Auto Differential[[id number]]        Normal              Final result                 Please view results for these tests on the individual orders.    Comprehensive Metabolic  Panel [[id number]]  (Abnormal) Collected: 11/14/24 1859    Specimen: Blood Updated: 11/14/24 1937     Glucose 127 mg/dL      BUN 18 mg/dL      Creatinine 0.98 mg/dL      Sodium 137 mmol/L      Potassium 3.7 mmol/L      Chloride 102 mmol/L      CO2 22.9 mmol/L      Calcium 9.3 mg/dL      Total Protein 7.1 g/dL      Albumin 4.3 g/dL      ALT (SGPT) 24 U/L      AST (SGOT) 19 U/L      Alkaline Phosphatase 113 U/L      Total Bilirubin 0.5 mg/dL      Globulin 2.8 gm/dL      A/G Ratio 1.5 g/dL      BUN/Creatinine Ratio 18.4     Anion Gap 12.1 mmol/L      eGFR 91.1 mL/min/1.73     Narrative:      GFR Normal >60  Chronic Kidney Disease <60  Kidney Failure <15      BNP [[id number]]  (Normal) Collected: 11/14/24 1859    Specimen: Blood Updated: 11/14/24 1935     proBNP <36.0 pg/mL     Narrative:      This assay is used as an aid in the diagnosis of individuals suspected of having heart failure. It can be used as an aid in the diagnosis of acute decompensated heart failure (ADHF) in patients presenting with signs and symptoms of ADHF to the emergency department (ED). In addition, NT-proBNP of <300 pg/mL indicates ADHF is not likely.    Age Range Result Interpretation  NT-proBNP Concentration (pg/mL:      <50             Positive            >450                   Gray                 300-450                    Negative             <300    50-75           Positive            >900                  Gray                300-900                  Negative            <300      >75             Positive            >1800                  Gray                300-1800                  Negative            <300    Single High Sensitivity Troponin T [[id number]]  (Normal) Collected: 11/14/24 1859    Specimen: Blood Updated: 11/14/24 1937     HS Troponin T <6 ng/L     Narrative:      High Sensitive Troponin T Reference Range:  <14.0 ng/L- Negative Female for AMI  <22.0 ng/L- Negative Male for AMI  >=14 - Abnormal Female indicating possible  myocardial injury.  >=22 - Abnormal Male indicating possible myocardial injury.   Clinicians would have to utilize clinical acumen, EKG, Troponin, and serial changes to determine if it is an Acute Myocardial Infarction or myocardial injury due to an underlying chronic condition.         CBC Auto Differential [[id number]]  (Normal) Collected: 11/14/24 1859    Specimen: Blood Updated: 11/14/24 1918     WBC 8.64 10*3/mm3      RBC 5.22 10*6/mm3      Hemoglobin 15.4 g/dL      Hematocrit 46.5 %      MCV 89.1 fL      MCH 29.5 pg      MCHC 33.1 g/dL      RDW 13.3 %      RDW-SD 43.3 fl      MPV 9.3 fL      Platelets 316 10*3/mm3      Neutrophil % 67.0 %      Lymphocyte % 22.1 %      Monocyte % 6.6 %      Eosinophil % 3.2 %      Basophil % 0.9 %      Immature Grans % 0.2 %      Neutrophils, Absolute 5.78 10*3/mm3      Lymphocytes, Absolute 1.91 10*3/mm3      Monocytes, Absolute 0.57 10*3/mm3      Eosinophils, Absolute 0.28 10*3/mm3      Basophils, Absolute 0.08 10*3/mm3      Immature Grans, Absolute 0.02 10*3/mm3      nRBC 0.0 /100 WBC              Imaging:    XR Chest 1 View    Result Date: 11/14/2024  XR CHEST 1 VW Date of Exam: 11/14/2024 7:21 PM EST Indication: SOA Triage Protocol Comparison: Chest radiograph 5/24/2021 Findings: Mediastinum: Cardiomediastinal silhouette appears unchanged accounting for differences in patient positioning/technique Lungs: Mild left basal opacities likely representing atelectasis. No convincing focal consolidation seen. Pleura: No pleural effusion or pneumothorax. Bones and soft tissues: No acute osseous abnormality.     Impression: No acute intrathoracic finding. Electronically Signed: Johnathan Jolley  11/14/2024 7:33 PM EST  Workstation ID: [id number]       Differential Diagnosis and Discussion:    Chest Pain:  Based on the patient's signs and symptoms, I considered aortic dissection, myocardial infaction, pulmonary embolism, cardiac tamponade, pericarditis, pneumothorax, musculoskeletal  chest pain and other differential diagnosis as an etiology of the patient's chest pain.   Dyspnea: Differential diagnosis includes but is not limited to metabolic acidosis, neurological disorders, psychogenic, asthma, pneumothorax, upper airway obstruction, COPD, pneumonia, noncardiogenic pulmonary edema, interstitial lung disease, anemia, congestive heart failure, and pulmonary embolism    All labs were reviewed and interpreted by me.  All X-rays impressions were independently interpreted by me.  EKG was interpreted by supervising attending.    MDM     Amount and/or Complexity of Data Reviewed  Clinical lab tests: reviewed  Tests in the radiology section of CPT®: reviewed    Risk of Complications, Morbidity, and/or Mortality  Presenting problems: moderate  Diagnostic procedures: low  Management options: low    Patient Progress  Patient progress: stable    The patient´s symptoms are consistent with costochondritis. This is confirmed by the presence of musculoskeletal chest wall tenderness on physical exam. The patient had an EKG that shows no acute changes. Specifically, there are no ST elevations, t-wave changes of concern, delta waves, or rhythm abnormalities warranting admission. The patient was placed on the cardiac monitor and observed with continuous telemetry. The patient has a chest x-ray that is negative for pneumothorax, pneumonia, and is essentially unremarkable. The patient has a normal troponin level on blood draw.     The patient is resting comfortably and feels better, is alert and in no distress. The repeat examination is unremarkable and benign. Electrocardiogram shows no signs of acute ischemia and the history, exam, diagnostic testing and current condition did not suggest that this patient is having an acute myocardial infarction, significant arrhythmia, unstable angina, esophageal perforation, pulmonary embolism, aortic dissection, severe pneumonia, sepsis for other significant pathology that  would warrant further testing, continued ED treatment, admission, cardiology or other specialist consultation at this point. The vital signs have been stable. The patient's condition is stable and appropriate for discharge. The patient will pursue further outpatient evaluation with the primary care physician, or designated physician or cardiologist. The patient has expressed a clear and thorough understanding and agreed to follow-up as instructed.                  Patient Care Considerations:    NARCOTICS: I considered prescribing opiate pain medication as an outpatient, however there are no acute fractures      Consultants/Shared Management Plan:    None    Social Determinants of Health:    Patient is independent, reliable, and has access to care.       Disposition and Care Coordination:    Discharged: The patient is suitable and stable for discharge with no need for consideration of admission.    I have explained the patient´s condition, diagnoses and treatment plan based on the information available to me at this time. I have answered questions and addressed any concerns. The patient has a good  understanding of the patient´s diagnosis, condition, and treatment plan as can be expected at this point. The vital signs have been stable. The patient´s condition is stable and appropriate for discharge from the emergency department.      The patient will pursue further outpatient evaluation with the primary care physician or other designated or consulting physician as outlined in the discharge instructions. They are agreeable to this plan of care and follow-up instructions have been explained in detail. The patient has received these instructions in written format and has expressed an understanding of the discharge instructions. The patient is aware that any significant change in condition or worsening of symptoms should prompt an immediate return to this or the closest emergency department or call to 911.  I have  explained discharge medications and the need for follow up with the patient/caretakers. This was also printed in the discharge instructions. Patient was discharged with the following medications and follow up:      Medication List        New Prescriptions      Lidocaine 4 %  Commonly known as: HM Lidocaine Patch  Place 1 patch on the skin as directed by provider Daily. Remove & Discard patch within 12 hours or as directed by MD               Where to Get Your Medications        These medications were sent to [id number] - [place], [place] - [address] AT [facility] - [phone]  - [phone]   [address] [place] [address]      Phone: [phone]   Lidocaine 4 %      No follow-up provider specified.     Final diagnoses:   Costochondritis, acute        ED Disposition       ED Disposition   Discharge    Condition   Stable    Comment   --               This medical record created using voice recognition software.             Martin Grant PA  11/14/24 [id number]

## 2024-11-30 LAB
QT INTERVAL: 364 MS
QTC INTERVAL: 414 MS

## 2025-03-25 ENCOUNTER — OFFICE VISIT (OUTPATIENT)
Dept: CARDIOLOGY | Facility: CLINIC | Age: 55
End: 2025-03-25
Payer: OTHER GOVERNMENT

## 2025-03-25 VITALS
DIASTOLIC BLOOD PRESSURE: 78 MMHG | BODY MASS INDEX: 39.04 KG/M2 | WEIGHT: 294.6 LBS | HEIGHT: 73 IN | SYSTOLIC BLOOD PRESSURE: 124 MMHG | HEART RATE: 79 BPM

## 2025-03-25 DIAGNOSIS — R07.89 OTHER CHEST PAIN: Primary | ICD-10-CM

## 2025-03-25 DIAGNOSIS — Z13.1 DIABETES MELLITUS SCREENING: ICD-10-CM

## 2025-03-25 DIAGNOSIS — I20.9 ANGINA PECTORIS: ICD-10-CM

## 2025-03-25 PROBLEM — E66.9 OBESITY (BMI 35.0-39.9 WITHOUT COMORBIDITY): Status: ACTIVE | Noted: 2025-03-25

## 2025-03-25 PROCEDURE — 99204 OFFICE O/P NEW MOD 45 MIN: CPT | Performed by: INTERNAL MEDICINE

## 2025-03-25 RX ORDER — ATORVASTATIN CALCIUM 20 MG/1
20 TABLET, FILM COATED ORAL DAILY
Qty: 90 TABLET | Refills: 3 | Status: SHIPPED | OUTPATIENT
Start: 2025-03-25

## 2025-03-25 RX ORDER — METOPROLOL TARTRATE 100 MG/1
100 TABLET ORAL ONCE
Qty: 1 TABLET | Refills: 0 | Status: SHIPPED | OUTPATIENT
Start: 2025-03-25 | End: 2025-03-25

## 2025-03-25 RX ORDER — METOPROLOL SUCCINATE 25 MG/1
1 TABLET, EXTENDED RELEASE ORAL DAILY
COMMUNITY
Start: 2025-02-17

## 2025-03-25 RX ORDER — ISOSORBIDE MONONITRATE 30 MG/1
30 TABLET, EXTENDED RELEASE ORAL DAILY
Qty: 90 TABLET | Refills: 3 | Status: SHIPPED | OUTPATIENT
Start: 2025-03-25

## 2025-03-25 NOTE — PROGRESS NOTES
[facility]  INTERVENTIONAL CARDIOLOGY NEW PATIENT OFFICE VISIT      Chief Complaint  Establish Care and Shortness of Breath    Subjective          History of Present Illness    Christian Copeland is a 54 y.o. male who presents to [facility] for new patient visit.       History of Present Illness  The patient presents for evaluation of chest pain.    He experienced an episode in 11/2024, which he initially suspected to be a heart attack. [facility].. He has since had multiple [facility] visits with the [facility], during which radiographic imaging revealed pulmonary fluid accumulation. [facility] recommended a stress test, which was conducted along with an echocardiogram. The most recent nuclear stress test was performed on 02/20/2025, and he was advised to consult with us.  Stress test was read as no significant perfusion defect.  However he had baseline T wave inversion in V2 and V3.  He continues to experience occasional chest pain, described as severe and lasting approximately 30 seconds, even at rest. These episodes have been infrequent recently, occurring once or twice in the past 30 to 60 days. During the episode that led to his ER visit, he experienced dyspnea and severe chest pain, akin to having all his ribs fractured. Initial evaluation ruled out a myocardial infarction but suggested possible costochondritis. Over the weekend, he sought care at an urgent care facility where a chest x-ray indicated ?cardiomegaly.  He reports [facility] however the reports are not available at this time.  He reports no lower extremity edema. He has never consulted with a cardiologist and has not been prescribed aspirin. He is currently on metoprolol 25 mg, taken in the morning.    His cholesterol levels are [facility] by [facility], but it has been some time since the last  check.    He has not been evaluated for diabetes.    Supplemental Information  [facility]. He has a CPAP machine and has started using it. He is on Zepbound weight loss medication.    SOCIAL HISTORY  He does not smoke. He drinks alcohol occasionally.    FAMILY HISTORY  His mother had a triple bypass at the age of 78 or 79.  He has a family history of colon cancer.    MEDICATIONS  Current: Metoprolol, Zepbound        Past History:  Past Medical History:   Diagnosis Date    Depression     HL (hearing loss)     Seasonal allergies     Sleep disorder     Tinnitus     Vertigo        Medical History:  Past Medical History:   Diagnosis Date    Depression     HL (hearing loss)     Seasonal allergies     Sleep disorder     Tinnitus     Vertigo        Surgical History:   has a past surgical history that includes Ankle surgery; Anal fissurectomy; Colonoscopy (06/26/2018); and Colonoscopy (N/A, 12/12/2022).     Family History:   family history includes Cancer in his father; Colon cancer (age of onset: 50) in his father; Diabetes in his paternal grandmother; Heart disease in his mother.     Social History:   reports that he has never smoked. He has never used smokeless tobacco. He reports that he does not currently use alcohol. He reports that he does not use drugs.    Allergies:   Patient has no known allergies.    Current Outpatient Medications on File Prior to Visit   Medication Sig    cetirizine (zyrTEC) 10 MG tablet Take 1 tablet by mouth Daily.    cholecalciferol 25 MCG tablet     escitalopram (LEXAPRO) 5 MG tablet Take 1 tablet by mouth Every Morning.    fluticasone (FLONASE) 50 MCG/ACT nasal spray Administer 2 sprays into the nostril(s) as directed by provider Daily.    metoprolol succinate XL (TOPROL-XL) 25 MG 24 hr tablet Take 1 tablet by mouth Daily.    temazepam (RESTORIL) 30 MG capsule Take 1 capsule by mouth At Night As Needed.    [DISCONTINUED] furosemide (LASIX) 20 MG  "tablet Take 1 tablet by mouth Daily for 3 days.    [DISCONTINUED] Lidocaine (HM Lidocaine Patch) 4 % Place 1 patch on the skin as directed by provider Daily. Remove & Discard patch within 12 hours or as directed by MD (Patient not taking: Reported on 3/25/2025)     No current facility-administered medications on file prior to visit.          Review of Systems   Negative ROS except as mentioned in HPI above.     Objective     /78 (BP Location: Right arm, Patient Position: Sitting, Cuff Size: Large Adult)   Pulse 79   Ht 185.4 cm (73\")   Wt 134 kg (294 lb 9.6 oz)   BMI 38.87 kg/m²       Physical Exam  General : Alert, awake, no acute distress  Neck : Supple, no carotid bruit, no jugular venous distention  CVS : Regular rate and rhythm, no murmur, rubs or gallops  Lungs: Clear to auscultation bilaterally, no crackles or rhonchi  Abdomen: Soft, nontender, bowel sounds heard in all 4 quadrants  Extremities: Warm, well-perfused, no pedal edema      Result Review :     The following data was reviewed by: Luis E Loomis MD on 03/25/2025:    CMP          11/14/2024    18:59   CMP   Glucose 127    BUN 18    Creatinine 0.98    EGFR 91.1    Sodium 137    Potassium 3.7    Chloride 102    Calcium 9.3    Total Protein 7.1    Albumin 4.3    Globulin 2.8    Total Bilirubin 0.5    Alkaline Phosphatase 113    AST (SGOT) 19    ALT (SGPT) 24    Albumin/Globulin Ratio 1.5    BUN/Creatinine Ratio 18.4    Anion Gap 12.1      CBC          11/14/2024    18:59   CBC   WBC 8.64    RBC 5.22    Hemoglobin 15.4    Hematocrit 46.5    MCV 89.1    MCH 29.5    MCHC 33.1    RDW 13.3    Platelets 316               Data reviewed: Cardiology studies        No results found for this or any previous visit.          Procedures  Stress test from 2/20/2020 reviewed, no significant perfusion defect.    The ASCVD Risk score (Renetta RUIZ, et al., 2019) failed to calculate for the following reasons:    Cannot find a previous HDL lab    Cannot find a " previous total cholesterol lab         Assessment and Plan      Diagnoses and all orders for this visit:    1. Other chest pain (Primary)  -     CT Angiogram Coronary; Future  -     Basic Metabolic Panel; Future  -     No Caffeine or Nicotine 4 Hours Prior to Coronary CTA Appointment  -     Nothing to Eat or Drink 4 Hours Prior to Coronary CTA Appointment  -     Do Not Take Phosphodiasterase Inhibitors in the 72 Hours Prior to Coronary CTA  -     metoprolol tartrate (LOPRESSOR) 100 MG tablet; Take 1 tablet by mouth 1 (One) Time for 1 dose. Take 100 mg One (1) Hour Prior to Coronary CTA  Dispense: 1 tablet; Refill: 0    2. Angina pectoris  -     CT Angiogram Coronary; Future  -     Basic Metabolic Panel; Future  -     No Caffeine or Nicotine 4 Hours Prior to Coronary CTA Appointment  -     Nothing to Eat or Drink 4 Hours Prior to Coronary CTA Appointment  -     Do Not Take Phosphodiasterase Inhibitors in the 72 Hours Prior to Coronary CTA  -     metoprolol tartrate (LOPRESSOR) 100 MG tablet; Take 1 tablet by mouth 1 (One) Time for 1 dose. Take 100 mg One (1) Hour Prior to Coronary CTA  Dispense: 1 tablet; Refill: 0    3. Diabetes mellitus screening  -     Lipid Panel; Future  -     Hemoglobin A1c; Future    Other orders  -     Stress Test Scan  -     isosorbide mononitrate (IMDUR) 30 MG 24 hr tablet; Take 1 tablet by mouth Daily.  Dispense: 90 tablet; Refill: 3  -     atorvastatin (LIPITOR) 20 MG tablet; Take 1 tablet by mouth Daily.  Dispense: 90 tablet; Refill: 3        Assessment & Plan  1. Chest pain.  The patient's stress test results were unremarkable. However, the EKG revealed T wave inversion in V2 and V3 which could suggest anterior ischemia.  Patient continues to have intermittent chest pain.  A CT coronary angiogram will be ordered to evaluate the coronary arteries for any blockages or calcification. He will be initiated on Imdur 30 mg daily for chest pain management. He has been advised to monitor his  blood pressure over the next 2 weeks and report any symptoms such as headaches or dizziness. A prescription for metoprolol 100 mg has been provided, which he should take 1 hour prior to the CT scan procedure, replacing his regular morning dose of metoprolol 25 mg on the day of the procedure. If the CT scan reveals any abnormalities, a heart catheterization will be considered.    2. Elevated blood glucose.  His last blood glucose level was 127 mg/dL on 11/14/2024. A fasting blood glucose test will be ordered to rule out diabetes.    3. Hyperlipidemia.  A fasting lipid panel will be ordered to assess cholesterol levels. He will be started on a low-dose cholesterol medication, with the possibility of increasing the dose if the lipid panel indicates elevated cholesterol levels.    Follow-up  The patient is scheduled for a follow-up visit in 4 months, or earlier if any abnormalities are detected in the CT scan.      Patient was educated on cardiac diet, adequate exercise and achieving/maintaining optimal weight.    Christian Copeland  reports that he has never smoked. He has never used smokeless tobacco.            Follow Up     Return in about 4 months (around 7/25/2025) for Next scheduled follow up.             I spent 45 minutes caring for this patient on this date of service. This time includes time spent by me in the following activities:preparing for the visit, reviewing tests, obtaining and/or reviewing a separately obtained history, performing a medically appropriate examination and/or evaluation , counseling and educating the patient/family/caregiver, ordering medications, tests, or procedures, referring and communicating with other health care professionals , documenting information in the medical record, independently interpreting results and communicating that information with the patient/family/caregiver, and care coordination.     The patient was seen and examined. Work by the provider also included review  and/or ordering of lab tests, review and/or ordering of radiology tests, review and/or ordering of medicine tests, discussion with other physicians or providers, independent review of data, obtaining old records, review/summation of old records, and/or other review.    I have reviewed the family history, social history, and past medical history for this patient. Previous information and data has been reviewed and updated as needed. I have reviewed and verified the chief complaint, history, and other documentation. The patient was interviewed and examined in the clinic and the chart reviewed. The previous observations, recommendations, and conclusions were reviewed including those of other providers.     The plan was discussed with the patient and/or family. The patient was given time to ask questions and these questions were answered. At the conclusion of their visit they had no additional questions or concerns and all questions were answered to their satisfaction.     Patient was given instructions and counseling regarding her condition or for health maintenance advice. Please see specific information pulled into the AVS if appropriate.      Patient or patient representative verbalized consent for the use of Ambient Listening during the visit with  Luis E Loomis MD for chart documentation. 3/25/2025       Luis E Loomis MD, [facility]  03/25/25  17:18 EDT    Dictated Utilizing Dragon Dictation

## 2025-03-25 NOTE — LETTER
March 25, 2025     Osmany Kaminski MD  [address]  [address]  [place] [address]    Patient: Christian Copeland   YOB: 1970   Date of Visit: 3/25/2025       Dear Osmany Kaminski MD,    Christian Copeland was in my office today. Below are the relevant portions of my assessment and plan of care.           If you have questions, please do not hesitate to call me. I look forward to following Christian along with you.         Sincerely,        Luis E Loomis MD        CC: LUIS ENRIQUE Stovall

## 2025-03-26 ENCOUNTER — LAB (OUTPATIENT)
Facility: HOSPITAL | Age: 55
End: 2025-03-26
Payer: OTHER GOVERNMENT

## 2025-03-26 DIAGNOSIS — R07.89 OTHER CHEST PAIN: ICD-10-CM

## 2025-03-26 DIAGNOSIS — I20.9 ANGINA PECTORIS: ICD-10-CM

## 2025-03-26 DIAGNOSIS — Z13.1 DIABETES MELLITUS SCREENING: ICD-10-CM

## 2025-03-26 LAB
ANION GAP SERPL CALCULATED.3IONS-SCNC: 10 MMOL/L (ref 5–15)
BUN SERPL-MCNC: 13 MG/DL (ref 6–20)
BUN/CREAT SERPL: 12.3 (ref 7–25)
CALCIUM SPEC-SCNC: 9.2 MG/DL (ref 8.6–10.5)
CHLORIDE SERPL-SCNC: 105 MMOL/L (ref 98–107)
CHOLEST SERPL-MCNC: 147 MG/DL (ref 0–200)
CO2 SERPL-SCNC: 26 MMOL/L (ref 22–29)
CREAT SERPL-MCNC: 1.06 MG/DL (ref 0.76–1.27)
EGFRCR SERPLBLD CKD-EPI 2021: 82.9 ML/MIN/1.73
GLUCOSE SERPL-MCNC: 84 MG/DL (ref 65–99)
HBA1C MFR BLD: 5.2 % (ref 4.8–5.6)
HDLC SERPL-MCNC: 34 MG/DL (ref 40–60)
LDLC SERPL CALC-MCNC: 99 MG/DL (ref 0–100)
LDLC/HDLC SERPL: 2.89 {RATIO}
POTASSIUM SERPL-SCNC: 4.4 MMOL/L (ref 3.5–5.2)
SODIUM SERPL-SCNC: 141 MMOL/L (ref 136–145)
TRIGL SERPL-MCNC: 73 MG/DL (ref 0–150)
VLDLC SERPL-MCNC: 14 MG/DL (ref 5–40)

## 2025-03-26 PROCEDURE — 83036 HEMOGLOBIN GLYCOSYLATED A1C: CPT

## 2025-03-26 PROCEDURE — 36415 COLL VENOUS BLD VENIPUNCTURE: CPT

## 2025-03-26 PROCEDURE — 80061 LIPID PANEL: CPT

## 2025-03-26 PROCEDURE — 80048 BASIC METABOLIC PNL TOTAL CA: CPT

## 2025-04-01 ENCOUNTER — RESULTS FOLLOW-UP (OUTPATIENT)
Dept: CARDIOLOGY | Facility: CLINIC | Age: 55
End: 2025-04-01
Payer: OTHER GOVERNMENT

## 2025-05-06 DIAGNOSIS — R07.89 OTHER CHEST PAIN: ICD-10-CM

## 2025-05-06 DIAGNOSIS — I20.9 ANGINA PECTORIS: ICD-10-CM

## 2025-07-28 ENCOUNTER — OFFICE VISIT (OUTPATIENT)
Dept: CARDIOLOGY | Facility: CLINIC | Age: 55
End: 2025-07-28
Payer: OTHER GOVERNMENT

## 2025-07-28 VITALS
SYSTOLIC BLOOD PRESSURE: 123 MMHG | OXYGEN SATURATION: 98 % | BODY MASS INDEX: 39.03 KG/M2 | WEIGHT: 294.5 LBS | DIASTOLIC BLOOD PRESSURE: 81 MMHG | HEIGHT: 73 IN | HEART RATE: 64 BPM

## 2025-07-28 DIAGNOSIS — R07.89 OTHER CHEST PAIN: ICD-10-CM

## 2025-07-28 DIAGNOSIS — R93.1 ELEVATED CORONARY ARTERY CALCIUM SCORE: ICD-10-CM

## 2025-07-28 DIAGNOSIS — I25.10 MILD CAD: Primary | ICD-10-CM

## 2025-07-28 DIAGNOSIS — E66.9 OBESITY (BMI 35.0-39.9 WITHOUT COMORBIDITY): ICD-10-CM

## 2025-07-28 PROCEDURE — 93000 ELECTROCARDIOGRAM COMPLETE: CPT | Performed by: INTERNAL MEDICINE

## 2025-07-28 PROCEDURE — 99214 OFFICE O/P EST MOD 30 MIN: CPT | Performed by: INTERNAL MEDICINE

## 2025-07-28 RX ORDER — NITROGLYCERIN 0.4 MG/1
TABLET SUBLINGUAL
Qty: 100 TABLET | Refills: 11 | Status: SHIPPED | OUTPATIENT
Start: 2025-07-28

## 2025-07-28 RX ORDER — ASPIRIN 81 MG/1
81 TABLET ORAL DAILY
Qty: 90 TABLET | Refills: 2 | Status: SHIPPED | OUTPATIENT
Start: 2025-07-28

## 2025-07-28 NOTE — PROGRESS NOTES
[facility]  INTERVENTIONAL CARDIOLOGY FOLLOW-UP PROGRESS NOTE        Chief Complaint  Chest Pain and Follow-up    Subjective            Chest Pain       Christian Copeland is a 54 y.o. male who presents to [facility] CARDIOLOGY    Patient is here for follow-up visit.  Patient previously seen for chest pain and underwent stress test which was not confirmatory.  He further underwent CT coronary angiogram that showed mild coronary disease without any significant occlusion that would require PCI.  Patient denies chest pain but has occasional shortness of breath.  EKG done in the office today shows T wave changes in anterior leads, unchanged from prior.       Past History:  Past Medical History:   Diagnosis Date    Depression     HL (hearing loss)     Seasonal allergies     Sleep disorder     Tinnitus     Vertigo        Medical History:  Past Medical History:   Diagnosis Date    Depression     HL (hearing loss)     Seasonal allergies     Sleep disorder     Tinnitus     Vertigo        Surgical History:   has a past surgical history that includes Ankle surgery; Anal fissurectomy; Colonoscopy (06/26/2018); and Colonoscopy (N/A, 12/12/2022).     Family History:   family history includes Cancer in his father; Colon cancer (age of onset: 50) in his father; Diabetes in his paternal grandmother; Heart disease in his mother.     Social History:   reports that he has never smoked. He has never been exposed to tobacco smoke. He has never used smokeless tobacco. He reports current alcohol use. He reports that he does not use drugs.    Allergies:   Patient has no known allergies.    Current Outpatient Medications on File Prior to Visit   Medication Sig    atorvastatin (LIPITOR) 20 MG tablet Take 1 tablet by mouth Daily.    cetirizine (zyrTEC) 10 MG tablet Take 1 tablet by mouth Daily.    cholecalciferol 25 MCG tablet     escitalopram (LEXAPRO) 5 MG tablet Take 1 tablet by mouth Every Morning.     "fluticasone (FLONASE) 50 MCG/ACT nasal spray Administer 2 sprays into the nostril(s) as directed by provider Daily.    metoprolol succinate XL (TOPROL-XL) 25 MG 24 hr tablet Take 1 tablet by mouth Daily.    temazepam (RESTORIL) 30 MG capsule Take 1 capsule by mouth At Night As Needed.    brompheniramine-pseudoephedrine-DM 30-2-10 MG/5ML syrup Take 5 mL by mouth 4 (Four) Times a Day As Needed for Allergies. (Patient not taking: Reported on 7/28/2025)    [DISCONTINUED] metoprolol tartrate (LOPRESSOR) 100 MG tablet Take 1 tablet by mouth 1 (One) Time for 1 dose. Take 100 mg One (1) Hour Prior to Coronary CTA (Patient not taking: Reported on 7/28/2025)     No current facility-administered medications on file prior to visit.          Review of Systems   Cardiovascular:  Positive for chest pain.      Negative except as mentioned in HPI above.    Objective     /81 (BP Location: Left arm, Patient Position: Sitting, Cuff Size: Large Adult)   Pulse 64   Ht 185.4 cm (73\")   Wt 134 kg (294 lb 8 oz)   SpO2 98%   BMI 38.85 kg/m²       Physical Exam    General : Alert, awake, no acute distress  Neck : Supple, no carotid bruit, no jugular venous distention  CVS : Regular rate and rhythm, no murmur, rubs or gallops  Lungs: Clear to auscultation bilaterally, no crackles or rhonchi  Abdomen: Soft, nontender, bowel sounds heard in all 4 quadrants  Extremities: Warm, well-perfused, no pedal edema    Result Review :     The following data was reviewed by: Luis E Loomis MD on 07/28/2025:    CMP          11/14/2024    18:59 3/26/2025    10:59   CMP   Glucose 127  84    BUN 18  13    Creatinine 0.98  1.06    EGFR 91.1  82.9    Sodium 137  141    Potassium 3.7  4.4    Chloride 102  105    Calcium 9.3  9.2    Total Protein 7.1     Albumin 4.3     Globulin 2.8     Total Bilirubin 0.5     Alkaline Phosphatase 113     AST (SGOT) 19     ALT (SGPT) 24     Albumin/Globulin Ratio 1.5     BUN/Creatinine Ratio 18.4  12.3    Anion Gap " 12.1  10.0      CBC          11/14/2024    18:59   CBC   WBC 8.64    RBC 5.22    Hemoglobin 15.4    Hematocrit 46.5    MCV 89.1    MCH 29.5    MCHC 33.1    RDW 13.3    Platelets 316        Lipid Panel          3/26/2025    10:59   Lipid Panel   Total Cholesterol 147    Triglycerides 73    HDL Cholesterol 34    VLDL Cholesterol 14    LDL Cholesterol  99    LDL/HDL Ratio 2.89       A1C Last 3 Results          3/26/2025    10:59   HGBA1C Last 3 Results   Hemoglobin A1C 5.20          Data reviewed: Cardiology studies    No results found for this or any previous visit.        ECG 12 Lead    Date/Time: 7/28/2025 12:34 PM  Performed by: Luis E Loomis MD    Authorized by: Luis E Loomis MD  Comparison: compared with previous ECG   Similar to previous ECG  Rhythm: sinus rhythm    Clinical impression: abnormal EKG  Comments: Sinus rhythm with abnormal T waves in anterior leads.        No results found for this or any previous visit.        ASCVD Score  The 10-year ASCVD risk score (Renetta DK, et al., 2019) is: 5.2%    Values used to calculate the score:      Age: 54 years      Sex: Male      Is Non- : No      Diabetic: No      Tobacco smoker: No      Systolic Blood Pressure: 123 mmHg      Is BP treated: No      HDL Cholesterol: 34 mg/dL      Total Cholesterol: 147 mg/dL         Assessment and Plan          Diagnoses and all orders for this visit:    1. Mild CAD (Primary)    2. Other chest pain    3. Obesity (BMI 35.0-39.9 without comorbidity)    4. Elevated coronary artery calcium score    Other orders  -     aspirin 81 MG EC tablet; Take 1 tablet by mouth Daily.  Dispense: 90 tablet; Refill: 2  -     nitroglycerin (NITROSTAT) 0.4 MG SL tablet; 1 under the tongue as needed for angina, may repeat q5mins for up three doses  Dispense: 100 tablet; Refill: 11  -     ECG 12 Lead          Plan  - Patient with mild coronary disease as per CT coronary angiogram.  Will start on aspirin 81 mg daily.   Continue atorvastatin 20 mg daily.  Continue metoprolol succinate 25 mg daily for antianginal therapy as well as for blood pressure.  - Patient will be prescribed sublingual nitroglycerin to be taken as needed.  - If patient develops worsening chest pain or shortness of breath episode, may need left heart cath for further evaluation in future.  - Will follow-up in 6 months or sooner if needed.      Patient was educated on heart healthy diet, daily exercise and achieving optimal weight.     Follow Up     Return in about 6 months (around 1/28/2026) for Next scheduled follow up, With Nayely Valadez.      Christian Copeland  reports that he has never smoked. He has never been exposed to tobacco smoke. He has never used smokeless tobacco.       I spent 30 minutes caring for this patient on this date of service. This time includes time spent by me in the following activities:preparing for the visit, reviewing tests, obtaining and/or reviewing a separately obtained history, performing a medically appropriate examination and/or evaluation , counseling and educating the patient/family/caregiver, ordering medications, tests, or procedures, referring and communicating with other health care professionals , documenting information in the medical record, independently interpreting results and communicating that information with the patient/family/caregiver, and care coordination.    I have reviewed the family history, social history, and past medical history for this patient. Previous information and data has been reviewed and updated as needed. I have reviewed and verified the chief complaint, history, and other documentation. The patient was interviewed and examined in the clinic and the chart reviewed. The previous observations, recommendations, and conclusions were reviewed including those of other providers.     The plan was discussed with the patient and/or family. The patient was given time to ask questions and  these questions were answered. At the conclusion of their visit they had no additional questions or concerns and all questions were answered to their satisfaction.     Patient was given instructions and counseling regarding her condition or for health maintenance advice. Please see specific information pulled into the AVS if appropriate.      Luis E Loomis MD, FACC  07/28/25  12:32 EDT    Dictated Utilizing Dragon Dictation

## 2025-07-28 NOTE — LETTER
July 28, 2025     Osmany Kaminski MD  [address]  [address]  [place] [address]    Patient: Christian Copeland   YOB: 1970   Date of Visit: 7/28/2025       Dear Osmany Kaminski MD,    Christian Copeland was in my office today. Below are the relevant portions of my assessment and plan of care.           If you have questions, please do not hesitate to call me. I look forward to following Christian along with you.         Sincerely,        Luis E Loomis MD        CC: LUIS ENRIQUE Stovall

## 2025-08-27 ENCOUNTER — TELEPHONE (OUTPATIENT)
Dept: GASTROENTEROLOGY | Facility: CLINIC | Age: 55
End: 2025-08-27
Payer: OTHER GOVERNMENT

## (undated) DEVICE — SOLIDIFIER LIQLOC PLS 1500CC BT

## (undated) DEVICE — SOL IRRG H2O PL/BG 1000ML STRL

## (undated) DEVICE — Device: Brand: DEFENDO AIR/WATER/SUCTION AND BIOPSY VALVE

## (undated) DEVICE — Device